# Patient Record
Sex: FEMALE | Race: WHITE | NOT HISPANIC OR LATINO | Employment: PART TIME | URBAN - METROPOLITAN AREA
[De-identification: names, ages, dates, MRNs, and addresses within clinical notes are randomized per-mention and may not be internally consistent; named-entity substitution may affect disease eponyms.]

---

## 2017-01-18 ENCOUNTER — CARE COORDINATION (OUTPATIENT)
Dept: PULMONOLOGY | Facility: CLINIC | Age: 57
End: 2017-01-18

## 2017-01-18 NOTE — PROGRESS NOTES
Telephone Encounter: Incoming    Reason for Call: Patient contacted regarding recent heart palpations, was seen by PCP at [facility], did Echo and EKG. Stated Echo showed enlarger Right artery related to ILD. PCP recommending she see a Cardiologist, they set one up at [facility] but wondering if Dr Ge would want her to see someone at the [facility].    Nursing Action/Patient Instruction: Informed her that we do have 2 cardiologist at the [facility] that see a lot of our patient with ILD that get Pulmonary Hypertension.     Patient Response/Questions/Concerns: She would like to see Cardiology here at [facility]    We will help arrange.

## 2017-02-05 NOTE — PROGRESS NOTES
"Kenny Lambert M.D.  Cardiovascular Medicine    I personally saw and examined this patient, discussed care with housestaff and other consultants, reviewed current laboratories and imaging studies, and conveyed impression and diagnostic/therapeutic plan to patient.    Problem List  1. Eosinophilic pneumonia   Steroid dependence  2. Breast cancer   Radiation   Chemotherapy  3. Atrial fibrillation (single episode while on steroids for the organizing pneumonia, 2014)  4. Hepatic steatosis       Referring:   Paradise Ge M.D.  Radha Suarez M.D.  Virgen William M.D.      Dear Providers,   We had the pleasure of meeting Ms. Meenakshi Serrano today in clinic. As you know, she is a 56 year old white female referred by Dr. William for palpitations (bounding sensation that lasted 2 hours about 1 month ago). Subsequent echocardiogram revealed mild right ventricular enlargement in the context of left sided breast cancer (IDCIS) treated with radiation and chemotherapy (no adriamycin; tamoxifen only).  She is a patient of Dr. Ge who sees her for chronic organizing eosinophilic pneumonia, possibly instigated by the prior radiation. She had a single episode of atrial fibrillation when her pulmonary disease was poorly controlled a couple years ago. She has been on tapering doses of prednisone (currently on 3 mg) and was started on montelukast and inhaled beclomethasone. She feels that her lungs are doing well.  She is exercising regularly with elliptical and yoga. 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  She did receive the flu vaccine.    With regard to the palpitations, she described these as a \"bounding\" sensation that occurred over a few hours then resolved. She had slight dizziness at that time. No presyncope. She palpated her pulse and it was regular and 80 bpm. This was nothing like the previous episode of atrial fibrillation. She had one other episode of palpitations in 2015. A one week Ziopatch in 2015 and a " "Holter monitor recently were done, but symptoms did not occur, and the tests were unremarkable.     PMH    History of breast cancer     IUD (intrauterine device) in place     Organizing pneumonia (HRC)     Paroxysmal atrial fibrillation (HRC)     Diverticulosis of colon     Myofascial pain on left side     History of dysplastic nevus     Insomnia     On prednisone therapy     History of basal cell cancer     History of actinic keratosis     Allergies: triamcinolone  Surgery: Knee and breast (left)   Family history: no sudden cardiac deaths    Objective    Vitals: /85 mmHg  Pulse 75  Ht 1.626 m (5' 4\")  Wt 73.891 kg (162 lb 14.4 oz)  BMI 27.95 kg/m2  SpO2 98%  Wt Readings from Last 2 Encounters:   02/09/17 73.891 kg (162 lb 14.4 oz)   12/09/16 70.308 kg (155 lb)     [unfilled]  Physical Exam:   General: Pleasant female, NAD  HEENT: anicteric. NCAT, MMM, normal palate, no xanthelasmas  JVP: no jugular venous distension  Cardiac: rrr. No m/r/g. Normal S1, S2. Radial and PT/DP are 2  Pulm: CTAB, no w/r/r  Abd: soft, nontener. +BS. No HSM.  Skin: no concerning lesions  Extremities: No LE edema, no cyanosis, w/w/p  Neuro: A&Ox3, no focal deficits, mentating well      Meds    Current Outpatient Prescriptions on File Prior to Visit:  predniSONE (DELTASONE) 1 MG tablet Take 1 tablet (1 mg) by mouth daily   montelukast (SINGULAIR) 10 MG tablet Take 1 tablet (10 mg) by mouth every evening   QVAR 80 MCG/ACT Inhaler INHALE 2 PUFFS BY MOUTH INTO THE LUNGS TWICE DAILY   sulfamethoxazole-trimethoprim (BACTRIM DS,SEPTRA DS) 800-160 MG per tablet Take 1 tab daily on Monday, Wednesday, and Friday.   Probiotic Product (PROBIOTIC PO)    omeprazole 20 MG tablet Take 1 tablet (20 mg) by mouth daily   VITAMIN D, CHOLECALCIFEROL, PO Take by mouth daily   Omega-3 Fatty Acids (OMEGA-3 FISH OIL PO) Take by mouth daily   Zolpidem Tartrate (AMBIEN PO) Take 10 mg by mouth nightly as needed for sleep     No current facility-administered " medications on file prior to visit.      Imaging     Chest CT 8/26/16  Impression:    1. Marked improvement in right lower lobe nodular opacities.  2. 7 mm right lower lobe pulmonary nodule. May be residual organizing  pneumonia versus separate nodule. Continued follow-up is recommended.  3. No new areas of consolidation.  4. Hepatic steatosis      ECHO 1/16/17  Global and regional left ventricular function is normal.  Left ventricular ejection fraction is visually estimated at 60%.  Mild right ventricular dilation with normal right ventricular function.  Agitated saline bubble study was performed to rule-out PFO/ASD.  Findings negative for interatrial shunting.  Pulmonary artery systolic pressure estimate is 24mmHg above RAP.    ECHO 10/17/2014  The rhythm was atrial fibrillation with controlled ventricular rate at rest.   The left ventricular size and ejection fraction are normal as the visual  ejection fraction is estimated at 55-60%.  The right ventricular systolic function is also normal. There is  mild tricuspid regurgitation.  The right   ventricular systolic pressure is normal approximated at 16mmHg plus the right atrial pressure. The aortic and mitral valves  are normal in structure and function. Normal left and right atrial size is noted by volume criteria.   EXCEPT FOR THE ATRIAL FIBRILLATION, THIS ECHO STUDY SHOWS ESSENTIALLY A NORMAL HEART with the observations as noted below.      Holter Monitor  - 1/16/17 - 1/17/17  Sinus rhythm.  Beats were in bradycardia (< 1%) and tachycardia (8%).      Heart rates range from  bpm.  Mean HR: 81 bpm.      Max R-R: 1.3 seconds.  Eight PACs and one PAC couplet.  Rare, mostly unifocal PVCs (20).    ZIO PATCH 7/3/15  Analysis Time: 7 days 3 hours (after artifact removed)  Patient had a min HR of 51 bpm, max HR of 173 bpm, and avg HR of 86 bpm.   Predominant underlying rhythm was Sinus Rhythm.   1 run of Supraventricular Tachycardia occurred lasting 4 beats with a  max rate of 113 bpm (avg 104 bpm). Supraventricular  Tachycardia was detected within +/- 45 seconds of the patient pressing the trigger button.  Isolated SVEs, SVE Couplets, and SVE Triplets were rare (0 to <1.0%).   Isolated VEs and VE Couplets were rare (0 to <1.0%), and no VE  Triplets were found.    EKG  Sinus bradycardia  Otherwise normal ECG  When compared with ECG of 26-JUN-2015 09:36,  Nonspecific T wave abnormality no longer evident in Anterior leads  Confirmed by JOSEPH MACK ([id number]),  RAVI HAGEN ([id number]) on 1/10/2017 1:08:36 PM    The patient did not report any symptoms.  CH2=V1; CH3=V3; CH1=V5/KB        Assessment/Plan       Orders:   Consider: CBC, CMP, BNP, TSH, iron, CRPinflam 14 hour fasting lipids  Consider Ziopath or Alive-Cor  event recorder to document nature of heart rhythm and rate during perceived episode and assess for atrial fibrillation  Cardiac JADON with gertrude enhancement suggested.      I do not think she has evidence of manifest pulmonary hypertension associated with PH, but rather probable RV changes secondary to radiation treatment for DCI.      CC: physicians above    I personally saw and examined this patient, discussed with fellow and patient and dictated note.

## 2017-02-07 ASSESSMENT — ENCOUNTER SYMPTOMS
HOT FLASHES: 1
NIGHT SWEATS: 1

## 2017-02-08 ENCOUNTER — PRE VISIT (OUTPATIENT)
Dept: CARDIOLOGY | Facility: CLINIC | Age: 57
End: 2017-02-08

## 2017-02-08 DIAGNOSIS — I51.7 RIGHT HEART ENLARGEMENT: ICD-10-CM

## 2017-02-08 DIAGNOSIS — J84.9 ILD (INTERSTITIAL LUNG DISEASE) (H): ICD-10-CM

## 2017-02-08 DIAGNOSIS — I48.91 ATRIAL FIBRILLATION, UNSPECIFIED TYPE (H): Primary | ICD-10-CM

## 2017-02-09 ENCOUNTER — OFFICE VISIT (OUTPATIENT)
Dept: CARDIOLOGY | Facility: CLINIC | Age: 57
End: 2017-02-09
Attending: INTERNAL MEDICINE
Payer: COMMERCIAL

## 2017-02-09 VITALS
SYSTOLIC BLOOD PRESSURE: 138 MMHG | HEART RATE: 75 BPM | WEIGHT: 162.9 LBS | HEIGHT: 64 IN | DIASTOLIC BLOOD PRESSURE: 85 MMHG | BODY MASS INDEX: 27.81 KG/M2 | OXYGEN SATURATION: 98 %

## 2017-02-09 DIAGNOSIS — I27.20 PULMONARY HYPERTENSION (H): Primary | ICD-10-CM

## 2017-02-09 PROCEDURE — 99204 OFFICE O/P NEW MOD 45 MIN: CPT | Mod: GC | Performed by: INTERNAL MEDICINE

## 2017-02-09 PROCEDURE — 99213 OFFICE O/P EST LOW 20 MIN: CPT | Mod: ZF

## 2017-02-09 ASSESSMENT — PAIN SCALES - GENERAL: PAINLEVEL: NO PAIN (0)

## 2017-02-09 NOTE — NURSING NOTE
Chief Complaint   Patient presents with     Eval/Assessment     Initial visit for enlarged right heart per pt noted on echo at [facility]; NEEDS EKG today

## 2017-02-09 NOTE — NURSING NOTE
Cardiac Testing: Patient given instructions regarding  Cardiac MRI. Discussed purpose, preparation, procedure and when to expect results reported back to the patient. Patient demonstrated understanding of this information and agreed to call with further questions or concerns.  Med Reconcile: Reviewed and verified all current medications with the patient. The updated medication list was printed and given to the patient.  Return Appointment: Patient given instructions regarding scheduling next clinic visit. Patient demonstrated understanding of this information and agreed to call with further questions or concerns.  Patient stated she understood all health information given and agreed to call with further questions or concerns.     Expand All Collapse All    Medication Changes:   No medication changes at this time. Please continue current medication regiment.   Patient Instructions:   Labs with your Primary Care MD (14 hour Fasting Lipid Profile)    Check-In   Time  Check-In Location  Estimated Length  Procedure   Name     [facility]   waiting room  60 minutes  Cardiac MRI**    Procedure Preparations & Instructions   This is a non-invasive procedure that DOES require preparation:   DO NOT use alcohol, tobacco or food/beverages containing caffeine for 12 hours prior to your procedure (ie. Coffee, tea, soda, chocolate, de-caffeinated beverages, certain medications including Excedrin, Anacin, NoDoz)    Follow up Appointment Information:   You will only need to follow up with us on an as needed basis. Please call us if your symptoms worsen or fail to improve for a follow up appointment. Thank you

## 2017-02-09 NOTE — PATIENT INSTRUCTIONS
Medication Changes:  No medication changes at this time. Please continue current medication regiment.     Patient Instructions:  Labs with your Primary Care MD (14 hour Fasting Lipid Profile)    Check-In  Time Check-In Location Estimated Length Procedure   Name        [facility]  waiting room 60 minutes Cardiac MRI**     Procedure Preparations & Instructions     This is a non-invasive procedure that DOES require preparation:    - DO NOT use alcohol, tobacco or food/beverages containing caffeine for 12 hours prior to your procedure (ie. Coffee, tea, soda, chocolate, de-caffeinated beverages, certain medications including Excedrin, Anacin, NoDoz)        Follow up Appointment Information:  You will only need to follow up with us on an as needed basis.  Please call us if your symptoms worsen or fail to improve for a follow up appointment.  Thank you       We are located on the third floor of the [facility] on the [facility].  Our address is     [address] [place] [address] [place] [address] [place] [address]      Thank you for allowing us to be a part of your care here at the [facility]    If you have questions or concerns please contact us at:    Lashawn Perez RN      Nurse Coordinator       Pulmonary Hypertension     [facility]   P)[phone]                ** Please note that you will NOT receive a reminder call regarding your scheduled testing, reminder calls are for provider appointments only.  If you are scheduled for testing within the [employer] system you may receive a call regarding pre-registration for billing purposes only.**     Remember to weigh yourself daily after voiding and before you consume any food or beverages and log the numbers.  If you have gained/lost 2 pounds overnight or 5 pounds in a week contact us immediately for medication adjustments or further instructions.

## 2017-02-09 NOTE — MR AVS SNAPSHOT
After Visit Summary   2/9/2017    Meenakshi Mckeon    MRN: [id number]           Patient Information     Date Of Birth          1960        Visit Information        Provider Department      2/9/2017 8:00 AM Kenny Lambert MD [facility]        Today's Diagnoses     Pulmonary hypertension (H)    -  1       Care Instructions    Medication Changes:  No medication changes at this time. Please continue current medication regiment.     Patient Instructions:  Labs with your Primary Care MD (14 hour Fasting Lipid Profile)    Check-In  Time Check-In Location Estimated Length Procedure   Name        [facility]  waiting room 60 minutes Cardiac MRI**     Procedure Preparations & Instructions     This is a non-invasive procedure that DOES require preparation:    - DO NOT use alcohol, tobacco or food/beverages containing caffeine for 12 hours prior to your procedure (ie. Coffee, tea, soda, chocolate, de-caffeinated beverages, certain medications including Excedrin, Anacin, NoDoz)        Follow up Appointment Information:  You will only need to follow up with us on an as needed basis.  Please call us if your symptoms worsen or fail to improve for a follow up appointment.  Thank you       We are located on the third floor of the [facility] ([facility] on the [facility].  Our address is     [address] [place] [address] on [address]rd [place] [address]      Thank you for allowing us to be a part of your care here at the [facility]    If you have questions or concerns please contact us at:    Lashawn Perez RN      Nurse Coordinator       Pulmonary Hypertension     [facility]   P)[phone]                ** Please note that you will NOT receive a reminder call regarding your scheduled testing, reminder calls are for provider appointments only.  If you are scheduled for testing within the [facility] system you may receive  a call regarding pre-registration for billing purposes only.**     Remember to weigh yourself daily after voiding and before you consume any food or beverages and log the numbers.  If you have gained/lost 2 pounds overnight or 5 pounds in a week contact us immediately for medication adjustments or further instructions.        Follow-ups after your visit        Follow-up notes from your care team     Return if symptoms worsen or fail to improve.      Your next 10 appointments already scheduled     Mar 31, 2017  8:40 AM   (Arrive by 8:25 AM)   CT CHEST W/O CONTRAST with UCCT2   [facility] [place] [facility] ([facility])    [address] [id number] [phone]           Please bring any scans or X-rays taken at other hospitals, if similar tests were done. Also bring a list of your medicines, including vitamins, minerals and over-the-counter drugs. It is safest to leave personal items at home.  Be sure to tell your doctor:   If you have any allergies.   If there s any chance you are pregnant.   If you are breastfeeding.   If you have any special needs.  You do not need to do anything special to prepare.  Please wear loose clothing, such as a sweat suit or jogging clothes. Avoid snaps, zippers and other metal. We may ask you to undress and put on a hospital gown.            Mar 31, 2017  9:00 AM   FULL PULMONARY FUNCTION with  PFL D   [facility] ([facility])    [address] [id number] [phone]            Mar 31, 2017 10:00 AM   (Arrive by 9:45 AM)   Return Interstitial Lung with Paradise Ge MD   [facility] ([facility])    [address] [id number] [phone]              Future tests that were ordered for you today     Open Future Orders        Priority Expected Expires  "Ordered    MRI Cardiac w/contrast and flow Routine 2/10/2017 2/9/2018 2/9/2017    EKG 12-lead, tracing only (Future) Routine 2/9/2017 3/8/2017 2/8/2017    Comprehensive metabolic panel Routine 2/9/2017 3/8/2017 2/8/2017    INR Routine 2/9/2017 3/8/2017 2/8/2017    CBC with platelets Routine 2/9/2017 3/8/2017 2/8/2017            Who to contact     If you have questions or need follow up information about today's clinic visit or your schedule please contact [facility] directly at [phone].  Normal or non-critical lab and imaging results will be communicated to you by [employer], letter or phone within 4 business days after the clinic has received the results. If you do not hear from us within 7 days, please contact the clinic through [employer] or phone. If you have a critical or abnormal lab result, we will notify you by phone as soon as possible.  Submit refill requests through [employer] or call your pharmacy and they will forward the refill request to us. Please allow 3 business days for your refill to be completed.          Additional Information About Your Visit        [employer] Information     [employer] gives you secure access to your electronic health record. If you see a primary care provider, you can also send messages to your care team and make appointments. If you have questions, please call your primary care clinic.  If you do not have a primary care provider, please call [phone] and they will assist you.        Care EveryWhere ID     This is your Care EveryWhere ID. This could be used by other organizations to access your [facility] medical records  [id number]        Your Vitals Were     Pulse Height BMI (Body Mass Index) Pulse Oximetry          75 1.626 m (5' 4\") 27.95 kg/m2 98%         Blood Pressure from Last 3 Encounters:   02/09/17 138/85   12/09/16 137/82   08/26/16 123/80    Weight from Last 3 Encounters:   02/09/17 73.891 kg (162 lb 14.4 oz)   12/09/16 70.308 kg (155 lb)   08/26/16 71.668 kg " (158 lb)                 Today's Medication Changes          These changes are accurate as of: 2/9/17  9:12 AM.  If you have any questions, ask your nurse or doctor.               These medicines have changed or have updated prescriptions.        Dose/Directions    predniSONE 1 MG tablet   Commonly known as:  DELTASONE   This may have changed:  additional instructions   Used for:  ILD (interstitial lung disease) (H), Pulmonary nodules        Dose:  1 mg   Take 1 tablet (1 mg) by mouth daily   Quantity:  360 tablet   Refills:  3                Primary Care Provider Office Phone # Fax #    Virgen Graham [phone]       [facility] [address] [place] [address]        Thank you!     Thank you for choosing [facility]  for your care. Our goal is always to provide you with excellent care. Hearing back from our patients is one way we can continue to improve our services. Please take a few minutes to complete the written survey that you may receive in the mail after your visit with us. Thank you!             Your Updated Medication List - Protect others around you: Learn how to safely use, store and throw away your medicines at www.disposemymeds.org.          This list is accurate as of: 2/9/17  9:12 AM.  Always use your most recent med list.                   Brand Name Dispense Instructions for use    AMBIEN PO      Take 10 mg by mouth nightly as needed for sleep       montelukast 10 MG tablet    SINGULAIR    90 tablet    Take 1 tablet (10 mg) by mouth every evening       OMEGA-3 FISH OIL PO      Take by mouth daily       omeprazole 20 MG tablet     30 tablet    Take 1 tablet (20 mg) by mouth daily       predniSONE 1 MG tablet    DELTASONE    360 tablet    Take 1 tablet (1 mg) by mouth daily       PROBIOTIC PO          QVAR 80 MCG/ACT Inhaler   Generic drug:  beclomethasone     8.7 g    INHALE 2 PUFFS BY MOUTH INTO THE LUNGS TWICE DAILY        sulfamethoxazole-trimethoprim 800-160 MG per tablet    BACTRIM DS/SEPTRA DS    36 tablet    Take 1 tab daily on Monday, Wednesday, and Friday.       VITAMIN D (CHOLECALCIFEROL) PO      Take by mouth daily

## 2017-02-09 NOTE — LETTER
"2/9/2017      RE: Meenakshi Mckeon  [address] [id number]       Dear Colleague,    Thank you for the opportunity to participate in the care of your patient, Meenakshi Mckeon, at the [facility]. Please see a copy of my visit note below.    Kenny Lambert M.D.  Cardiovascular Medicine    I personally saw and examined this patient, discussed care with housestaff and other consultants, reviewed current laboratories and imaging studies, and conveyed impression and diagnostic/therapeutic plan to patient.    Problem List  1. Eosinophilic pneumonia   Steroid dependence  2. Breast cancer   Radiation   Chemotherapy  3. Atrial fibrillation (single episode while on steroids for the organizing pneumonia, 2014)  4. Hepatic steatosis       Referring:   Paradise Ge M.D.  Radha Suarez M.D.  Virgen William M.D.      Dear Providers,   We had the pleasure of meeting Ms. Meenakshi Serrano today in clinic. As you know, she is a 56 year old white female referred by Dr. William for palpitations (bounding sensation that lasted 2 hours about 1 month ago). Subsequent echocardiogram revealed mild right ventricular enlargement in the context of left sided breast cancer (IDCIS) treated with radiation and chemotherapy (no adriamycin; tamoxifen only).  She is a patient of Dr. Ge who sees her for chronic organizing eosinophilic pneumonia, possibly instigated by the prior radiation. She had a single episode of atrial fibrillation when her pulmonary disease was poorly controlled a couple years ago. She has been on tapering doses of prednisone (currently on 3 mg) and was started on montelukast and inhaled beclomethasone. She feels that her lungs are doing well.  She is exercising regularly with elliptical and yoga. 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  She did receive the flu vaccine.    With regard to the palpitations, she described these as a \"bounding\" sensation " "that occurred over a few hours then resolved. She had slight dizziness at that time. No presyncope. She palpated her pulse and it was regular and 80 bpm. This was nothing like the previous episode of atrial fibrillation. She had one other episode of palpitations in 2015. A one week Ziopatch in 2015 and a Holter monitor recently were done, but symptoms did not occur, and the tests were unremarkable.     PMH    History of breast cancer     IUD (intrauterine device) in place     Organizing pneumonia (HRC)     Paroxysmal atrial fibrillation (HRC)     Diverticulosis of colon     Myofascial pain on left side     History of dysplastic nevus     Insomnia     On prednisone therapy     History of basal cell cancer     History of actinic keratosis     Allergies: triamcinolone  Surgery: Knee and breast (left)   Family history: no sudden cardiac deaths    Objective    Vitals: /85 mmHg  Pulse 75  Ht 1.626 m (5' 4\")  Wt 73.891 kg (162 lb 14.4 oz)  BMI 27.95 kg/m2  SpO2 98%  Wt Readings from Last 2 Encounters:   02/09/17 73.891 kg (162 lb 14.4 oz)   12/09/16 70.308 kg (155 lb)     [unfilled]  Physical Exam:   General: Pleasant female, NAD  HEENT: anicteric. NCAT, MMM, normal palate, no xanthelasmas  JVP: no jugular venous distension  Cardiac: rrr. No m/r/g. Normal S1, S2. Radial and PT/DP are 2  Pulm: CTAB, no w/r/r  Abd: soft, nontener. +BS. No HSM.  Skin: no concerning lesions  Extremities: No LE edema, no cyanosis, w/w/p  Neuro: A&Ox3, no focal deficits, mentating well      Meds    Current Outpatient Prescriptions on File Prior to Visit:  predniSONE (DELTASONE) 1 MG tablet Take 1 tablet (1 mg) by mouth daily   montelukast (SINGULAIR) 10 MG tablet Take 1 tablet (10 mg) by mouth every evening   QVAR 80 MCG/ACT Inhaler INHALE 2 PUFFS BY MOUTH INTO THE LUNGS TWICE DAILY   sulfamethoxazole-trimethoprim (BACTRIM DS,SEPTRA DS) 800-160 MG per tablet Take 1 tab daily on Monday, Wednesday, and Friday.   Probiotic Product " (PROBIOTIC PO)    omeprazole 20 MG tablet Take 1 tablet (20 mg) by mouth daily   VITAMIN D, CHOLECALCIFEROL, PO Take by mouth daily   Omega-3 Fatty Acids (OMEGA-3 FISH OIL PO) Take by mouth daily   Zolpidem Tartrate (AMBIEN PO) Take 10 mg by mouth nightly as needed for sleep     No current facility-administered medications on file prior to visit.      Imaging     Chest CT 8/26/16  Impression:    1. Marked improvement in right lower lobe nodular opacities.  2. 7 mm right lower lobe pulmonary nodule. May be residual organizing  pneumonia versus separate nodule. Continued follow-up is recommended.  3. No new areas of consolidation.  4. Hepatic steatosis      ECHO 1/16/17  Global and regional left ventricular function is normal.  Left ventricular ejection fraction is visually estimated at 60%.  Mild right ventricular dilation with normal right ventricular function.  Agitated saline bubble study was performed to rule-out PFO/ASD.  Findings negative for interatrial shunting.  Pulmonary artery systolic pressure estimate is 24mmHg above RAP.    ECHO 10/17/2014  The rhythm was atrial fibrillation with controlled ventricular rate at rest.   The left ventricular size and ejection fraction are normal as the visual  ejection fraction is estimated at 55-60%.  The right ventricular systolic function is also normal. There is  mild tricuspid regurgitation.  The right   ventricular systolic pressure is normal approximated at 16mmHg plus the right atrial pressure. The aortic and mitral valves  are normal in structure and function. Normal left and right atrial size is noted by volume criteria.   EXCEPT FOR THE ATRIAL FIBRILLATION, THIS ECHO STUDY SHOWS ESSENTIALLY A NORMAL HEART with the observations as noted below.      Holter Monitor  - 1/16/17 - 1/17/17  Sinus rhythm.  Beats were in bradycardia (< 1%) and tachycardia (8%).      Heart rates range from  bpm.  Mean HR: 81 bpm.      Max R-R: 1.3 seconds.  Eight PACs and one PAC  couplet.  Rare, mostly unifocal PVCs (20).    ZIO PATCH 7/3/15  Analysis Time: 7 days 3 hours (after artifact removed)  Patient had a min HR of 51 bpm, max HR of 173 bpm, and avg HR of 86 bpm.   Predominant underlying rhythm was Sinus Rhythm.   1 run of Supraventricular Tachycardia occurred lasting 4 beats with a max rate of 113 bpm (avg 104 bpm). Supraventricular  Tachycardia was detected within +/- 45 seconds of the patient pressing the trigger button.  Isolated SVEs, SVE Couplets, and SVE Triplets were rare (0 to <1.0%).   Isolated VEs and VE Couplets were rare (0 to <1.0%), and no VE  Triplets were found.    EKG  Sinus bradycardia  Otherwise normal ECG  When compared with ECG of 2015 09:36,  Nonspecific T wave abnormality no longer evident in Anterior leads  Confirmed by JOSEPH MACK ([id number]),  RAVI HAGEN ([id number]) on 1/10/2017 1:08:36 PM    The patient did not report any symptoms.  CH2=V1; CH3=V3; CH1=V5/KB        Assessment/Plan       Orders:   Consider: CBC, CMP, BNP, TSH, iron, CRPinflam 14 hour fasting lipids  Consider Ziopath or Alive-Cor  event recorder to document nature of heart rhythm and rate during perceived episode and assess for atrial fibrillation  Cardiac JADON with gertrude enhancement suggested.      I do not think she has evidence of manifest pulmonary hypertension associated with PH, but rather probable RV changes secondary to radiation treatment for DCI.      CC: physicians above    I personally saw and examined this patient, discussed with fellow and patient and dictated note.    2017            Paradise eG MD   [facility]   [address]   [place] [facility] [address]      Virgen Graham MD   [facility]   [address] [place] [address]      RE: Meenakshi Mckeon   MRN: [id number]   : 1960      Dear Sonu:       I had the opportunity to see your patient, Meenakshi Mckeon.  A full note will follow from our fellow with  whom I saw the patient.  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  There is no family history of sudden cardiac death nor cardiomyopathy.  She was treated with tamoxifen and localized radiotherapy to the left mammary region as treatment for ductal carcinoma in situ.  She has no history of stimulant usage, alcohol abuse, pulmonary emboli, deep venous thrombosis, connective tissue disease, congenital heart disease, liver disease or other obvious etiologies for right circulatory dysfunction.      Review of her chart shows a slightly enlarged right ventricle with normal ventricular function and normal estimated pulmonary artery pressure.  Incidentally noted was hepatic steatosis.  She has no history of hyperlipidemia.  She is not diabetic.  Her lipids do not appear to have been measured recently.      ALLERGIES:  None.      PHYSICAL EXAMINATION:  Alert, oriented, well-developed, well-nourished white female.  The neck is supple.  Jugular venous pressure is within normal limits.  The chest is clear to auscultation and percussion.  The rhythm is regular.  There is no evidence of left-to-right ventricular overactivity.  S1, S2 without heave, thrill, rub, murmur, gallop.  There is no peripheral edema.  The liver span is 8 cm and it is not enlarged.  There is no evidence of portal hypertension.      In summary, a 56-year-old white female with a history of palpitations, 1 documented episode of atrial fibrillation with a serious respiratory illness.  She has undergone previous monitoring which was unremarkable.      We discussed:   1.  She may wish to obtain the [employer] application [employer] in order to use it as an event recorder.  This has been very successful in patients who have very infrequent episodes to document the  nature of the rhythm during their periods of symptoms.      2.  We suggested she talk with Virgen regarding a 14-hour fasting lipid profile to look at triglycerides.  We discussed with her the various factors that contribute to steatosis including steroids, diet, alcohol, intrinsic errors of triglyceride metabolism and the like.      3.  One could consider cardiac MRI with gadolinium enhancement.  It is most likely that the right ventricular changes are a result of the radiation therapy that she received for ductal carcinoma in situ.  This would at least provide a very quantitative baseline determination.  As you know, the right ventricle is extremely difficult to image accurately in view of its banana-shaped configuration which goes in and out of 2-point imaging.      We did not give her a return appointment but would be happy to see her at any time you think desirable.      Sincerely yours,      Kenny Lambert MD      D: 2017 09:19   T: 2017 10:21   MT: ZAK      Name:     DAISY TORO   MRN:      [id number]        Account:      [id number]   :      1960           Service Date: 2017      Document: [id number]

## 2017-02-10 NOTE — PROGRESS NOTES
2017            Paradise Ge MD   [facility]   [address]   [facility] [address]      Virgen Graham MD   [facility]   [address] [place] [address]      RE: Meenakshi Mckeon   MRN: [id number]   : 1960      Dear Sonu:       I had the opportunity to see your patient, Meenakshi Mckeon.  A full note will follow from our fellow with whom I saw the patient.  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  There is no family history of sudden cardiac death nor cardiomyopathy.  She was treated with tamoxifen and localized radiotherapy to the left mammary region as treatment for ductal carcinoma in situ.  She has no history of stimulant usage, alcohol abuse, pulmonary emboli, deep venous thrombosis, connective tissue disease, congenital heart disease, liver disease or other obvious etiologies for right circulatory dysfunction.      Review of her chart shows a slightly enlarged right ventricle with normal ventricular function and normal estimated pulmonary artery pressure.  Incidentally noted was hepatic steatosis.  She has no history of hyperlipidemia.  She is not diabetic.  Her lipids do not appear to have been measured recently.      ALLERGIES:  None.      PHYSICAL EXAMINATION:  Alert, oriented, well-developed, well-nourished white female.  The neck is supple.  Jugular venous pressure is within normal limits.  The chest is clear to auscultation and percussion.  The rhythm is regular.  There is no evidence of left-to-right ventricular overactivity.  S1, S2 without heave, thrill, rub, murmur, gallop.  There is no peripheral edema.  The liver span is 8 cm and it is not enlarged.  There is no evidence of portal hypertension.      In summary, a 56-year-old  white female with a history of palpitations, 1 documented episode of atrial fibrillation with a serious respiratory illness.  She has undergone previous monitoring which was unremarkable.      We discussed:   1.  She may wish to obtain the iPhone application [employer] in order to use it as an event recorder.  This has been very successful in patients who have very infrequent episodes to document the nature of the rhythm during their periods of symptoms.      2.  We suggested she talk with Virgen regarding a 14-hour fasting lipid profile to look at triglycerides.  We discussed with her the various factors that contribute to steatosis including steroids, diet, alcohol, intrinsic errors of triglyceride metabolism and the like.      3.  One could consider cardiac MRI with gadolinium enhancement.  It is most likely that the right ventricular changes are a result of the radiation therapy that she received for ductal carcinoma in situ.  This would at least provide a very quantitative baseline determination.  As you know, the right ventricle is extremely difficult to image accurately in view of its banana-shaped configuration which goes in and out of 2-point imaging.      We did not give her a return appointment but would be happy to see her at any time you think desirable.      Sincerely yours,      MD JARET Perez MD             D: 2017 09:19   T: 2017 10:21   MT: ZAK      Name:     DAISY TORO   MRN:      [id number]        Account:      [id number]   :      1960           Service Date: 2017      Document: [id number]

## 2017-03-26 ASSESSMENT — ENCOUNTER SYMPTOMS
CHILLS: 0
JOINT SWELLING: 0
INCREASED ENERGY: 0
HYPOTENSION: 0
POLYPHAGIA: 0
EXERCISE INTOLERANCE: 0
DECREASED APPETITE: 0
LIGHT-HEADEDNESS: 0
SLEEP DISTURBANCES DUE TO BREATHING: 0
WEIGHT GAIN: 0
MYALGIAS: 0
ALTERED TEMPERATURE REGULATION: 0
MUSCLE WEAKNESS: 0
ARTHRALGIAS: 1
SYNCOPE: 0
BACK PAIN: 0
ORTHOPNEA: 0
WEIGHT LOSS: 0
LEG SWELLING: 0
FATIGUE: 0
HALLUCINATIONS: 0
TACHYCARDIA: 0
LEG PAIN: 0
FEVER: 0
NECK PAIN: 0
MUSCLE CRAMPS: 1
HYPERTENSION: 0
POLYDIPSIA: 0
CLAUDICATION: 0
PALPITATIONS: 0
NIGHT SWEATS: 1
STIFFNESS: 1

## 2017-03-31 ENCOUNTER — OFFICE VISIT (OUTPATIENT)
Dept: PULMONOLOGY | Facility: CLINIC | Age: 57
End: 2017-03-31
Attending: INTERNAL MEDICINE
Payer: COMMERCIAL

## 2017-03-31 VITALS
RESPIRATION RATE: 16 BRPM | DIASTOLIC BLOOD PRESSURE: 85 MMHG | SYSTOLIC BLOOD PRESSURE: 148 MMHG | BODY MASS INDEX: 27.18 KG/M2 | OXYGEN SATURATION: 92 % | WEIGHT: 159.2 LBS | HEIGHT: 64 IN | HEART RATE: 84 BPM

## 2017-03-31 DIAGNOSIS — J84.9 ILD (INTERSTITIAL LUNG DISEASE) (H): ICD-10-CM

## 2017-03-31 DIAGNOSIS — J82.89 PULMONARY EOSINOPHILIA (H): ICD-10-CM

## 2017-03-31 DIAGNOSIS — J84.9 ILD (INTERSTITIAL LUNG DISEASE) (H): Primary | ICD-10-CM

## 2017-03-31 DIAGNOSIS — R91.8 PULMONARY NODULES: ICD-10-CM

## 2017-03-31 PROBLEM — J82.81 EOSINOPHILIC PNEUMONIA (H): Status: ACTIVE | Noted: 2017-03-31

## 2017-03-31 PROCEDURE — 99212 OFFICE O/P EST SF 10 MIN: CPT | Mod: ZF

## 2017-03-31 RX ORDER — PREDNISONE 5 MG/1
TABLET ORAL
Qty: 120 TABLET | Refills: 11 | Status: SHIPPED | OUTPATIENT
Start: 2017-03-31 | End: 2018-05-04

## 2017-03-31 RX ORDER — MONTELUKAST SODIUM 10 MG/1
10 TABLET ORAL EVERY EVENING
Qty: 90 TABLET | Refills: 3 | Status: SHIPPED | OUTPATIENT
Start: 2017-03-31 | End: 2018-05-04

## 2017-03-31 ASSESSMENT — PAIN SCALES - GENERAL: PAINLEVEL: NO PAIN (0)
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
## 2017-03-31 NOTE — MR AVS SNAPSHOT
After Visit Summary   3/31/2017    Meenakshi Mckeon    MRN: [id number]           Patient Information     Date Of Birth          1960        Visit Information        Provider Department      3/31/2017 10:00 AM Paradise Ge MD [facility]        Today's Diagnoses     ILD (interstitial lung disease) (H)    -  1       Follow-ups after your visit        Follow-up notes from your care team     Return in about 3 months (around 6/30/2017).      Your next 10 appointments already scheduled     Jun 30, 2017  6:40 AM CDT   (Arrive by 6:25 AM)   CT CHEST W/O CONTRAST with UCCT2   [facility] [place] [facility] ([facility])    [address] [address] [phone]           Please bring any scans or X-rays taken at other hospitals, if similar tests were done. Also bring a list of your medicines, including vitamins, minerals and over-the-counter drugs. It is safest to leave personal items at home.  Be sure to tell your doctor:   If you have any allergies.   If there s any chance you are pregnant.   If you are breastfeeding.   If you have any special needs.  You do not need to do anything special to prepare.  Please wear loose clothing, such as a sweat suit or jogging clothes. Avoid snaps, zippers and other metal. We may ask you to undress and put on a hospital gown.            Jun 30, 2017  7:00 AM CDT   FULL PULMONARY FUNCTION with  PFL A   [facility] Pulmonary Function Testing ([facility])    [address] [id number] [phone]            Jun 30, 2017  8:00 AM CDT   (Arrive by 7:45 AM)   Return Interstitial Lung with Paradise Ge MD   [facility] ([facility])    [address] [id number] [phone]              Future tests that were ordered for you today     Open  "Future Orders        Priority Expected Expires Ordered    CT Chest w/o Contrast Routine 6/30/2017 3/31/2018 3/31/2017    Pulmonary Function Test Routine 6/30/2017 3/31/2018 3/31/2017    General PFT Lab (Please always keep checked) Routine  3/31/2018 3/31/2017            Who to contact     If you have questions or need follow up information about today's clinic visit or your schedule please contact [facility] directly at [phone].  Normal or non-critical lab and imaging results will be communicated to you by mobintenthart, letter or phone within 4 business days after the clinic has received the results. If you do not hear from us within 7 days, please contact the clinic through Vibrynt or phone. If you have a critical or abnormal lab result, we will notify you by phone as soon as possible.  Submit refill requests through Vibrynt or call your pharmacy and they will forward the refill request to us. Please allow 3 business days for your refill to be completed.          Additional Information About Your Visit        Vibrynt Information     Vibrynt gives you secure access to your electronic health record. If you see a primary care provider, you can also send messages to your care team and make appointments. If you have questions, please call your primary care clinic.  If you do not have a primary care provider, please call [phone] and they will assist you.        Care EveryWhere ID     This is your Care EveryWhere ID. This could be used by other organizations to access your [facility] medical records  [id number]        Your Vitals Were     Pulse Respirations Height Pulse Oximetry BMI (Body Mass Index)       84 16 1.626 m (5' 4\") 92% 27.33 kg/m2        Blood Pressure from Last 3 Encounters:   03/31/17 148/85   02/09/17 138/85   12/09/16 137/82    Weight from Last 3 Encounters:   03/31/17 72.2 kg (159 lb 3.2 oz)   02/09/17 73.9 kg (162 lb 14.4 oz)   12/09/16 70.3 kg (155 lb)            "      Today's Medication Changes          These changes are accurate as of: 3/31/17 10:47 AM.  If you have any questions, ask your nurse or doctor.               Start taking these medicines.        Dose/Directions    zoster vaccine live (PF) injection   Commonly known as:  ZOSTAVAX   Used for:  ILD (interstitial lung disease) (H)   Started by:  Paradise Ge MD        Dose:  1 each   Inject 0.65 mLs Subcutaneous once for 1 dose   Quantity:  0.65 mL   Refills:  0         These medicines have changed or have updated prescriptions.        Dose/Directions    beclomethasone 80 MCG/ACT Inhaler   Commonly known as:  QVAR   This may have changed:  See the new instructions.   Used for:  ILD (interstitial lung disease) (H)   Changed by:  Paradise Ge MD        INHALE 2 PUFFS BY MOUTH INTO THE LUNGS TWICE DAILY   Quantity:  8.7 g   Refills:  11       * predniSONE 1 MG tablet   Commonly known as:  DELTASONE   This may have changed:  additional instructions   Used for:  ILD (interstitial lung disease) (H), Pulmonary nodules   Changed by:  Paradise Ge MD        Dose:  1 mg   Take 1 tablet (1 mg) by mouth daily   Quantity:  360 tablet   Refills:  3       * predniSONE 5 MG tablet   Commonly known as:  DELTASONE   This may have changed:  You were already taking a medication with the same name, and this prescription was added. Make sure you understand how and when to take each.   Used for:  ILD (interstitial lung disease) (H)   Changed by:  Paradise Ge MD        20 mg daily for 2 weeks, then 15 mg daily for 2 weeks, then 10 mg daily.   Quantity:  120 tablet   Refills:  11       * Notice:  This list has 2 medication(s) that are the same as other medications prescribed for you. Read the directions carefully, and ask your doctor or other care provider to review them with you.         Where to get your medicines      These medications were sent to [facility] [address] [facility] [place] - [address] [facility]  [address]  [address] [address]     Phone:  [phone]     predniSONE 5 MG tablet         Some of these will need a paper prescription and others can be bought over the counter.  Ask your nurse if you have questions.     Bring a paper prescription for each of these medications     beclomethasone 80 MCG/ACT Inhaler    montelukast 10 MG tablet    zoster vaccine live (PF) injection                Primary Care Provider Office Phone # Fax #    Virgen Graham [phone]       [facility] [address] [place] [address]        Thank you!     Thank you for choosing [facility]  for your care. Our goal is always to provide you with excellent care. Hearing back from our patients is one way we can continue to improve our services. Please take a few minutes to complete the written survey that you may receive in the mail after your visit with us. Thank you!             Your Updated Medication List - Protect others around you: Learn how to safely use, store and throw away your medicines at www.disposemymeds.org.          This list is accurate as of: 3/31/17 10:47 AM.  Always use your most recent med list.                   Brand Name Dispense Instructions for use    AMBIEN PO      Take 10 mg by mouth nightly as needed for sleep       beclomethasone 80 MCG/ACT Inhaler    QVAR    8.7 g    INHALE 2 PUFFS BY MOUTH INTO THE LUNGS TWICE DAILY       montelukast 10 MG tablet    SINGULAIR    90 tablet    Take 1 tablet (10 mg) by mouth every evening       OMEGA-3 FISH OIL PO      Take by mouth daily       omeprazole 20 MG tablet     30 tablet    Take 1 tablet (20 mg) by mouth daily       * predniSONE 1 MG tablet    DELTASONE    360 tablet    Take 1 tablet (1 mg) by mouth daily       * predniSONE 5 MG tablet    DELTASONE    120 tablet    20 mg daily for 2 weeks, then 15 mg daily for 2 weeks, then 10 mg daily.       PROBIOTIC PO           sulfamethoxazole-trimethoprim 800-160 MG per tablet    BACTRIM DS/SEPTRA DS    36 tablet    Take 1 tab daily on Monday, Wednesday, and Friday.       VITAMIN D (CHOLECALCIFEROL) PO      Take by mouth daily       zoster vaccine live (PF) injection    ZOSTAVAX    0.65 mL    Inject 0.65 mLs Subcutaneous once for 1 dose       * Notice:  This list has 2 medication(s) that are the same as other medications prescribed for you. Read the directions carefully, and ask your doctor or other care provider to review them with you.
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
## 2017-03-31 NOTE — NURSING NOTE
Chief Complaint   Patient presents with     Interstitial Lung Disease (ILD)     Patient is being seen for ILD follow up      Zenaida Carrasco CMA at 9:53 AM on 3/31/2017

## 2017-04-14 ENCOUNTER — CARE COORDINATION (OUTPATIENT)
Dept: PULMONOLOGY | Facility: CLINIC | Age: 57
End: 2017-04-14

## 2017-04-14 DIAGNOSIS — J22 LOWER RESPIRATORY INFECTION: Primary | ICD-10-CM

## 2017-04-14 RX ORDER — DOXYCYCLINE HYCLATE 100 MG
100 TABLET ORAL 2 TIMES DAILY
Qty: 28 TABLET | Refills: 0 | Status: SHIPPED | OUTPATIENT
Start: 2017-04-14 | End: 2017-04-28

## 2017-04-14 NOTE — PROGRESS NOTES
Telephone Encounter: Incoming    Reason for Call: Coughing for the past few days, green production, mild chills, low grade fever 99, nasal drainage. Wondering about getting treated or if she needs to be seen?    Nursing Action/Patient Instruction: Discussed with Dr Ge, she would like to treat her with Doxycycline BID for 14 days. Informed patient.    Patient Response/Questions/Concerns: Agreed with plan.

## 2017-04-24 LAB
DLCOUNC-%PRED-PRE: 101 %
DLCOUNC-PRE: 22.27 ML/MIN/MMHG
DLCOUNC-PRED: 21.96 ML/MIN/MMHG
ERV-%PRED-PRE: 30 %
ERV-PRE: 0.23 L
ERV-PRED: 0.76 L
EXPTIME-PRE: 8.03 SEC
FEF2575-%PRED-PRE: 111 %
FEF2575-PRE: 2.68 L/SEC
FEF2575-PRED: 2.41 L/SEC
FEFMAX-%PRED-PRE: 118 %
FEFMAX-PRE: 7.61 L/SEC
FEFMAX-PRED: 6.45 L/SEC
FEV1-%PRED-PRE: 97 %
FEV1-PRE: 2.52 L
FEV1FEV6-PRE: 83 %
FEV1FEV6-PRED: 81 %
FEV1FVC-PRE: 83 %
FEV1FVC-PRED: 80 %
FEV1SVC-PRE: 82 %
FEV1SVC-PRED: 77 %
FIFMAX-PRE: 4 L/SEC
FRCPLETH-%PRED-PRE: 84 %
FRCPLETH-PRE: 2.29 L
FRCPLETH-PRED: 2.7 L
FVC-%PRED-PRE: 93 %
FVC-PRE: 3.05 L
FVC-PRED: 3.26 L
IC-%PRED-PRE: 110 %
IC-PRE: 2.84 L
IC-PRED: 2.57 L
RVPLETH-%PRED-PRE: 111 %
RVPLETH-PRE: 2.06 L
RVPLETH-PRED: 1.85 L
TLCPLETH-%PRED-PRE: 103 %
TLCPLETH-PRE: 5.14 L
TLCPLETH-PRED: 4.94 L
VA-%PRED-PRE: 88 %
VA-PRE: 4.49 L
VC-%PRED-PRE: 92 %
VC-PRE: 3.08 L
VC-PRED: 3.33 L

## 2017-06-17 ENCOUNTER — HEALTH MAINTENANCE LETTER (OUTPATIENT)
Age: 57
End: 2017-06-17

## 2017-06-19 ENCOUNTER — CARE COORDINATION (OUTPATIENT)
Dept: PULMONOLOGY | Facility: CLINIC | Age: 57
End: 2017-06-19

## 2017-06-19 NOTE — PROGRESS NOTES
Patient called stating that she is having symptoms of a flare up of chronic eosinophilic pneumonia.  She is scheduled to see Dr. Ge with a chest CT scan on June 30 and would like to know if she can come in this week for a chest CT instead of waiting until June 30.  Discussed with Dr. Ge who okayed patient to get chest CT scan which we will review this week. Patient is in agreement with this plan and will get chest CT done this afternoon.

## 2017-06-21 ENCOUNTER — CARE COORDINATION (OUTPATIENT)
Dept: PULMONOLOGY | Facility: CLINIC | Age: 57
End: 2017-06-21

## 2017-06-21 NOTE — PROGRESS NOTES
Patient sent the following email:      Just checking in to see if you have the results of the CT I had done the other day.  I am off work today and doing various errands.    My cell phone is [phone]  Home phone is [phone]    I should be able to be reached at either of those numbers, otherwise email is fine too.    Also, FYI... I am going to go see my primary MD, Virgen Graham tomorrow morning.  I have now a second vaginal yeast infection.  The first I called her and she gave me a course of diflucan , which cleared it up, but now I have another.  Also noticed I might have some thrush.  Should I continue on the QVAR?  I also wanted her to check some blood work, specifically a glucose in light of the yeast infections.  Please either communicate with her or myself if you and Dr. Ge would recommend other blood work or tests.  my appointment is at 7 a.m. tomorrow.  She is with [facility].    My current oral prednisone dose is 10 mg.      Thank you,    Discussed with Dr. Ge.  Patient's chest CT scan looks improved over previous CT scan.  Patient should hold QVAR until she sees Dr. Ge next week.  If patient gets a second course of diflucan from Dr. Martinez she does not need further treatment for thrush.  If not given diflucan, Dr. Ge will prescribe Mycelex Troches.  Patient is to stay on prednisone 10 mg.  Patient agreed with plan.

## 2017-06-25 ASSESSMENT — ENCOUNTER SYMPTOMS
SINUS PAIN: 0
TROUBLE SWALLOWING: 0
DECREASED LIBIDO: 1
NAIL CHANGES: 0
NECK MASS: 0
TASTE DISTURBANCE: 0
SKIN CHANGES: 0
SORE THROAT: 1
HOARSE VOICE: 1
SINUS CONGESTION: 0
HOT FLASHES: 1
POOR WOUND HEALING: 0
SMELL DISTURBANCE: 0

## 2017-06-30 ENCOUNTER — OFFICE VISIT (OUTPATIENT)
Dept: PULMONOLOGY | Facility: CLINIC | Age: 57
End: 2017-06-30
Attending: INTERNAL MEDICINE
Payer: COMMERCIAL

## 2017-06-30 VITALS
DIASTOLIC BLOOD PRESSURE: 88 MMHG | BODY MASS INDEX: 27.59 KG/M2 | SYSTOLIC BLOOD PRESSURE: 145 MMHG | RESPIRATION RATE: 16 BRPM | HEIGHT: 64 IN | OXYGEN SATURATION: 97 % | WEIGHT: 161.6 LBS | HEART RATE: 68 BPM

## 2017-06-30 DIAGNOSIS — J84.9 ILD (INTERSTITIAL LUNG DISEASE) (H): Primary | ICD-10-CM

## 2017-06-30 DIAGNOSIS — R91.8 PULMONARY NODULES: ICD-10-CM

## 2017-06-30 DIAGNOSIS — J84.9 ILD (INTERSTITIAL LUNG DISEASE) (H): ICD-10-CM

## 2017-06-30 PROCEDURE — 99212 OFFICE O/P EST SF 10 MIN: CPT | Mod: ZF

## 2017-06-30 ASSESSMENT — PAIN SCALES - GENERAL: PAINLEVEL: NO PAIN (0)

## 2017-06-30 NOTE — LETTER
6/30/2017      RE: Meenakshi Mckeon  [address]  [facility] [address]       [facility]    Reason for Visit  Meenakshi Mckeon is a 57 year old year old female who is being seen for Interstitial Lung Disease (ILD) (Patient is being seen for ILD follow up)    HPI    Ms. Mckeon is a 56 yo who is here for f/u of chronic eosinophilic pneumonia. She thought that her CEP might be flaring again a couple weeks ago due to lip tingling and fatigue (there are her usual symptoms of CEP recurrence, in addition to eye discomfort).  She also has been having increased left neuropathic chest discomfort, which she has had ever since her XRT.  She will see Dr. Clayton, whom she has seen in the past.  She also will try to restart massage therapy, which has helped in the past.   Denies blisters, cough, shortness of breath, fevers.  She continues to take montelukast.  Stopped beclomethasone due to thrush.  Took fluconazole, and thrush is improved.  She is swimming without problems.    Chest CT 3 mo ago showed new LLL consolidation - prednisone was increased to 20 mg daily and tapered down to 10 mg.  She remains on 10 mg daily.        Current Outpatient Prescriptions   Medication     montelukast (SINGULAIR) 10 MG tablet     predniSONE (DELTASONE) 5 MG tablet     Probiotic Product (PROBIOTIC PO)     VITAMIN D, CHOLECALCIFEROL, PO     Omega-3 Fatty Acids (OMEGA-3 FISH OIL PO)     Zolpidem Tartrate (AMBIEN PO)     No current facility-administered medications for this visit.      Allergies   Allergen Reactions     Kenalog [Triamcinolone] Swelling     Past Medical History:   Diagnosis Date     Atrial fibrillation (H) 10/2014     Cancer (H)     Breast DCIS dx 2013. Lumpectomy and radiation completed 1/2014     Gastro-oesophageal reflux disease      ILD (interstitial lung disease) (H)     possibly associated with radiation therapy, CT-guided needle bx 2014 of LLL consolidation showed organizing  "eosinophlic pneumonia (reviewed by Ethel and Cindy at [facility]). Started prednisone 4/2014.  BAL 6/2014 showed no eos (?if she was on pred at the time).     Lung nodule     7 mm RLL nodule first noted 2016     Right heart enlargement 2/8/2017       Past Surgical History:   Procedure Laterality Date     cubocalneal fusion       KNEE SURGERY       LUMPECTOMY BREAST Left 2013       Social History     Social History     Marital status:      Spouse name: N/A     Number of children: N/A     Years of education: N/A     Occupational History     Not on file.     Social History Main Topics     Smoking status: Former Smoker     Packs/day: 0.50     Years: 20.00     Types: Cigarettes     Quit date: 7/7/2000     Smokeless tobacco: Not on file     Alcohol use 0.0 oz/week     0 Standard drinks or equivalent per week     Drug use: No     Sexual activity: Not on file     Other Topics Concern     Not on file     Social History Narrative    No exposure to asbestos, hot tubs, birds, mold, silica.  Hobbies: gardening, biking.  Nurse at [employer].  .       Family History   Problem Relation Age of Onset     CEREBROVASCULAR DISEASE Mother      Hypertension Mother      C.A.D. Maternal Grandfather      both grfa with MI     Chronic Obstructive Pulmonary Disease Mother              ROS Pulmonary  A complete ROS was otherwise negative except as noted in the HPI.    Vitals: /88 (BP Location: Right arm, Patient Position: Chair, Cuff Size: Adult Regular)  Pulse 68  Resp 16  Ht 1.626 m (5' 4\")  Wt 73.3 kg (161 lb 9.6 oz)  SpO2 97%  BMI 27.74 kg/m2    Exam:   GENERAL APPEARANCE: Well developed, well nourished, alert, and in no apparent distress.  RESP: good air flow throughout.  No crackles. No rhonchi. No wheezes.  CV: Normal S1, S2, regular rhythm, normal rate. No murmur.  No LE edema.   MS: extremities normal. No clubbing. No cyanosis.  SKIN: no rash on limited exam.  No blisters.  NEURO: Mentation intact, " speech normal, normal gait and stance.  PSYCH: mentation appears normal. and affect normal/bright.    Results:  Recent Results (from the past 168 hour(s))   General PFT Lab (Please always keep checked)    Collection Time: 06/30/17  7:09 AM   Result Value Ref Range    FVC-Pred 3.25 L    FVC-Pre 3.04 L    FVC-%Pred-Pre 93 %    FEV1-Pre 2.50 L    FEV1-%Pred-Pre 97 %    FEV1FVC-Pred 80 %    FEV1FVC-Pre 82 %    FEFMax-Pred 6.44 L/sec    FEFMax-Pre 6.87 L/sec    FEFMax-%Pred-Pre 106 %    FEF2575-Pred 2.40 L/sec    FEF2575-Pre 2.54 L/sec    RKG6039-%Pred-Pre 105 %    ExpTime-Pre 7.35 sec    FIFMax-Pre 4.25 L/sec    VC-Pred 3.33 L    VC-Pre 3.20 L    VC-%Pred-Pre 96 %    IC-Pred 2.59 L    IC-Pre 2.99 L    IC-%Pred-Pre 115 %    ERV-Pred 0.74 L    ERV-Pre 0.21 L    ERV-%Pred-Pre 28 %    FEV1FEV6-Pred 81 %    FEV1FEV6-Pre 82 %    FRCPleth-Pred 2.70 L    FRCPleth-Pre 2.12 L    FRCPleth-%Pred-Pre 78 %    RVPleth-Pred 1.86 L    RVPleth-Pre 1.91 L    RVPleth-%Pred-Pre 102 %    TLCPleth-Pred 4.94 L    TLCPleth-Pre 5.11 L    TLCPleth-%Pred-Pre 103 %    DLCOunc-Pred 21.93 ml/min/mmHg    DLCOunc-Pre 21.38 ml/min/mmHg    DLCOunc-%Pred-Pre 97 %    VA-Pre 4.69 L    VA-%Pred-Pre 92 %    FEV1SVC-Pred 77 %    FEV1SVC-Pre 78 %     I reviewed PFT today: normal.  I reviewed chest CT from 6/19: LLL consolidation resolved, no new consolidations.    I reviewed results with the patient.      Assessment and plan:   Mrs. Mckeon is a 57-year-old woman with chronic organizing eosinophilic pneumonia who is here for followup.    1.  Interstitial lung disease.  Her chronic organizing eosinophilic pneumonia is thought to be possibly associated with her prior radiation therapy for breast cancer.  There are a few case reports of this association in other patients.  She was tolerating the prednisone taper well until chest CT that 3 mo ago showed a new consolidated area in the left lower lobe, which was likely a recurrence of the chronic organizing  eosinophilic pneumonia.   The chest CT today shows resolution of the LLL consolidation.  It is clear that her CEP will recur again, and the challenge is finding the correct maintenance dose of prednisone to prevent future flares.  For now, will cont pred 10 mg daily until she RTC in 3 mo.  If she does well, then will try a very slow taper in 3 mo to 5 mg daily.  We have not been able to get insurance approval for mepolizumab.  Mepolizumab is an antibody against IL-5 and inhibits eosinophil function, so there is a scientific rationale for using it for chronic eosinophilic pneumonia.  It does have orphan status for use in hypereosinophilic syndrome by the FDA.  I encouraged her to continue swimming.  She will return in 3 months.  2.  PFF patient registry.  She is currently participating.               Paradise Ge MD

## 2017-06-30 NOTE — NURSING NOTE
Chief Complaint   Patient presents with     Interstitial Lung Disease (ILD)     Patient is being seen for ILD follow up      Zenaida Carrasco CMA at 7:40 AM on 6/30/2017

## 2017-06-30 NOTE — MR AVS SNAPSHOT
After Visit Summary   6/30/2017    Meenakshi Mckeon    MRN: [id number]           Patient Information     Date Of Birth          1960        Visit Information        Provider Department      6/30/2017 8:00 AM Paradise Ge MD [facility]        Care Instructions    Continue prednisone 10 mg daily.  Stop Qvar.          Follow-ups after your visit        Follow-up notes from your care team     Return in about 3 months (around 9/30/2017).      Your next 10 appointments already scheduled     Oct 06, 2017  8:00 AM CDT   (Arrive by 7:45 AM)   Return Interstitial Lung with Paradise Ge MD   [facility] ([facility])    [address]  [address] [facility] [address] [phone]              Who to contact     If you have questions or need follow up information about today's clinic visit or your schedule please contact [facility] directly at [phone].  Normal or non-critical lab and imaging results will be communicated to you by Anaquahart, letter or phone within 4 business days after the clinic has received the results. If you do not hear from us within 7 days, please contact the clinic through Meaningfyt or phone. If you have a critical or abnormal lab result, we will notify you by phone as soon as possible.  Submit refill requests through Shopear or call your pharmacy and they will forward the refill request to us. Please allow 3 business days for your refill to be completed.          Additional Information About Your Visit        Anaquahart Information     Shopear gives you secure access to your electronic health record. If you see a primary care provider, you can also send messages to your care team and make appointments. If you have questions, please call your primary care clinic.  If you do not have a primary care provider, please call [phone] and they will assist  "you.        Care EveryWhere ID     This is your Care EveryWhere ID. This could be used by other organizations to access your [facility] medical records  [id number]        Your Vitals Were     Pulse Respirations Height Pulse Oximetry BMI (Body Mass Index)       68 16 1.626 m (5' 4\") 97% 27.74 kg/m2        Blood Pressure from Last 3 Encounters:   06/30/17 145/88   03/31/17 148/85   02/09/17 138/85    Weight from Last 3 Encounters:   06/30/17 73.3 kg (161 lb 9.6 oz)   03/31/17 72.2 kg (159 lb 3.2 oz)   02/09/17 73.9 kg (162 lb 14.4 oz)              Today, you had the following     No orders found for display         Today's Medication Changes          These changes are accurate as of: 6/30/17  8:10 AM.  If you have any questions, ask your nurse or doctor.               Stop taking these medicines if you haven't already. Please contact your care team if you have questions.     beclomethasone 80 MCG/ACT Inhaler   Commonly known as:  QVAR   Stopped by:  Paradise Ge MD                    Primary Care Provider Office Phone # Fax #    Virgen Graham [phone]       [facility] [address] [place] [address]        Equal Access to Services     SARAHY MORALES AH: Yevgeniy samanoo Sorandee, waaxda luqadaha, qaybta kaalmada cl, marcus fernandez. So [facility] [phone].    ATENCIÓN: Si habla español, tiene a rodriguez disposición servicios gratuitos de asistencia lingüística. Llame al [phone].    We comply with applicable federal civil rights laws and [place] laws. We do not discriminate on the basis of race, color, national origin, age, disability sex, sexual orientation or gender identity.            Thank you!     Thank you for choosing [facility]  for your care. Our goal is always to provide you with excellent care. Hearing back from our patients is one way we can continue to improve our services. Please take a " few minutes to complete the written survey that you may receive in the mail after your visit with us. Thank you!             Your Updated Medication List - Protect others around you: Learn how to safely use, store and throw away your medicines at www.disposemymeds.org.          This list is accurate as of: 6/30/17  8:10 AM.  Always use your most recent med list.                   Brand Name Dispense Instructions for use Diagnosis    AMBIEN PO      Take 10 mg by mouth nightly as needed for sleep        montelukast 10 MG tablet    SINGULAIR    90 tablet    Take 1 tablet (10 mg) by mouth every evening    ILD (interstitial lung disease) (H)       OMEGA-3 FISH OIL PO      Take by mouth daily        predniSONE 5 MG tablet    DELTASONE    120 tablet    20 mg daily for 2 weeks, then 15 mg daily for 2 weeks, then 10 mg daily.    ILD (interstitial lung disease) (H)       PROBIOTIC PO       ILD (interstitial lung disease) (H)       VITAMIN D (CHOLECALCIFEROL) PO      Take by mouth daily

## 2017-06-30 NOTE — PROGRESS NOTES
[facility]    Reason for Visit  Meenakshi Mckeon is a 57 year old year old female who is being seen for Interstitial Lung Disease (ILD) (Patient is being seen for ILD follow up)    HPI    Ms. Mckeon is a 58 yo who is here for f/u of chronic eosinophilic pneumonia. She thought that her CEP might be flaring again a couple weeks ago due to lip tingling and fatigue (there are her usual symptoms of CEP recurrence, in addition to eye discomfort).  She also has been having increased left neuropathic chest discomfort, which she has had ever since her XRT.  She will see Dr. Clayton, whom she has seen in the past.  She also will try to restart massage therapy, which has helped in the past.   Denies blisters, cough, shortness of breath, fevers.  She continues to take montelukast.  Stopped beclomethasone due to thrush.  Took fluconazole, and thrush is improved.  She is swimming without problems.    Chest CT 3 mo ago showed new LLL consolidation - prednisone was increased to 20 mg daily and tapered down to 10 mg.  She remains on 10 mg daily.        Current Outpatient Prescriptions   Medication     montelukast (SINGULAIR) 10 MG tablet     predniSONE (DELTASONE) 5 MG tablet     Probiotic Product (PROBIOTIC PO)     VITAMIN D, CHOLECALCIFEROL, PO     Omega-3 Fatty Acids (OMEGA-3 FISH OIL PO)     Zolpidem Tartrate (AMBIEN PO)     No current facility-administered medications for this visit.      Allergies   Allergen Reactions     Kenalog [Triamcinolone] Swelling     Past Medical History:   Diagnosis Date     Atrial fibrillation (H) 10/2014     Cancer (H)     Breast DCIS dx 2013. Lumpectomy and radiation completed 1/2014     Gastro-oesophageal reflux disease      ILD (interstitial lung disease) (H)     possibly associated with radiation therapy, CT-guided needle bx 2014 of LLL consolidation showed organizing eosinophlic pneumonia (reviewed by Soha at [facility]). Started  "prednisone 4/2014.  BAL 6/2014 showed no eos (?if she was on pred at the time).     Lung nodule     7 mm RLL nodule first noted 2016     Right heart enlargement 2/8/2017       Past Surgical History:   Procedure Laterality Date     cubocalneal fusion       KNEE SURGERY       LUMPECTOMY BREAST Left 2013       Social History     Social History     Marital status:      Spouse name: N/A     Number of children: N/A     Years of education: N/A     Occupational History     Not on file.     Social History Main Topics     Smoking status: Former Smoker     Packs/day: 0.50     Years: 20.00     Types: Cigarettes     Quit date: 7/7/2000     Smokeless tobacco: Not on file     Alcohol use 0.0 oz/week     0 Standard drinks or equivalent per week     Drug use: No     Sexual activity: Not on file     Other Topics Concern     Not on file     Social History Narrative    No exposure to asbestos, hot tubs, birds, mold, silica.  Hobbies: gardening, biking.  Nurse at [employer].  .       Family History   Problem Relation Age of Onset     CEREBROVASCULAR DISEASE Mother      Hypertension Mother      C.A.D. Maternal Grandfather      both grfa with MI     Chronic Obstructive Pulmonary Disease Mother              ROS Pulmonary  A complete ROS was otherwise negative except as noted in the HPI.    Vitals: /88 (BP Location: Right arm, Patient Position: Chair, Cuff Size: Adult Regular)  Pulse 68  Resp 16  Ht 1.626 m (5' 4\")  Wt 73.3 kg (161 lb 9.6 oz)  SpO2 97%  BMI 27.74 kg/m2    Exam:   GENERAL APPEARANCE: Well developed, well nourished, alert, and in no apparent distress.  RESP: good air flow throughout.  No crackles. No rhonchi. No wheezes.  CV: Normal S1, S2, regular rhythm, normal rate. No murmur.  No LE edema.   MS: extremities normal. No clubbing. No cyanosis.  SKIN: no rash on limited exam.  No blisters.  NEURO: Mentation intact, speech normal, normal gait and stance.  PSYCH: mentation appears normal. and " affect normal/bright.    Results:  Recent Results (from the past 168 hour(s))   General PFT Lab (Please always keep checked)    Collection Time: 06/30/17  7:09 AM   Result Value Ref Range    FVC-Pred 3.25 L    FVC-Pre 3.04 L    FVC-%Pred-Pre 93 %    FEV1-Pre 2.50 L    FEV1-%Pred-Pre 97 %    FEV1FVC-Pred 80 %    FEV1FVC-Pre 82 %    FEFMax-Pred 6.44 L/sec    FEFMax-Pre 6.87 L/sec    FEFMax-%Pred-Pre 106 %    FEF2575-Pred 2.40 L/sec    FEF2575-Pre 2.54 L/sec    SYS9722-%Pred-Pre 105 %    ExpTime-Pre 7.35 sec    FIFMax-Pre 4.25 L/sec    VC-Pred 3.33 L    VC-Pre 3.20 L    VC-%Pred-Pre 96 %    IC-Pred 2.59 L    IC-Pre 2.99 L    IC-%Pred-Pre 115 %    ERV-Pred 0.74 L    ERV-Pre 0.21 L    ERV-%Pred-Pre 28 %    FEV1FEV6-Pred 81 %    FEV1FEV6-Pre 82 %    FRCPleth-Pred 2.70 L    FRCPleth-Pre 2.12 L    FRCPleth-%Pred-Pre 78 %    RVPleth-Pred 1.86 L    RVPleth-Pre 1.91 L    RVPleth-%Pred-Pre 102 %    TLCPleth-Pred 4.94 L    TLCPleth-Pre 5.11 L    TLCPleth-%Pred-Pre 103 %    DLCOunc-Pred 21.93 ml/min/mmHg    DLCOunc-Pre 21.38 ml/min/mmHg    DLCOunc-%Pred-Pre 97 %    VA-Pre 4.69 L    VA-%Pred-Pre 92 %    FEV1SVC-Pred 77 %    FEV1SVC-Pre 78 %     I reviewed PFT today: normal.  I reviewed chest CT from 6/19: LLL consolidation resolved, no new consolidations.    I reviewed results with the patient.      Assessment and plan:   Mrs. Mckeon is a 57-year-old woman with chronic organizing eosinophilic pneumonia who is here for followup.    1.  Interstitial lung disease.  Her chronic organizing eosinophilic pneumonia is thought to be possibly associated with her prior radiation therapy for breast cancer.  There are a few case reports of this association in other patients.  She was tolerating the prednisone taper well until chest CT that 3 mo ago showed a new consolidated area in the left lower lobe, which was likely a recurrence of the chronic organizing eosinophilic pneumonia.  The chest CT today shows resolution of the LLL consolidation.   It is clear that her CEP will recur again, and the challenge is finding the correct maintenance dose of prednisone to prevent future flares.  For now, will cont pred 10 mg daily until she RTC in 3 mo.  If she does well, then will try a very slow taper in 3 mo to 5 mg daily.  We have not been able to get insurance approval for mepolizumab.  Mepolizumab is an antibody against IL-5 and inhibits eosinophil function, so there is a scientific rationale for using it for chronic eosinophilic pneumonia.  It does have orphan status for use in hypereosinophilic syndrome by the FDA.  I encouraged her to continue swimming.  She will return in 3 months.  2.  PFF patient registry.  She is currently participating.

## 2017-10-01 ASSESSMENT — ENCOUNTER SYMPTOMS
MUSCLE CRAMPS: 0
ABDOMINAL PAIN: 0
JAUNDICE: 0
BOWEL INCONTINENCE: 0
MUSCLE WEAKNESS: 0
HYPERTENSION: 0
DIARRHEA: 0
ORTHOPNEA: 0
ARTHRALGIAS: 1
CLAUDICATION: 0
VOMITING: 0
SLEEP DISTURBANCES DUE TO BREATHING: 0
MYALGIAS: 0
STIFFNESS: 0
RECTAL PAIN: 0
LIGHT-HEADEDNESS: 0
TACHYCARDIA: 1
HYPOTENSION: 0
RECTAL BLEEDING: 0
PALPITATIONS: 1
CONSTIPATION: 0
BACK PAIN: 0
LEG SWELLING: 0
NECK PAIN: 0
EXERCISE INTOLERANCE: 0
JOINT SWELLING: 0
HEARTBURN: 1
SYNCOPE: 0
LEG PAIN: 0
BLOOD IN STOOL: 0
NAUSEA: 0
BLOATING: 0

## 2017-10-06 ENCOUNTER — OFFICE VISIT (OUTPATIENT)
Dept: PULMONOLOGY | Facility: CLINIC | Age: 57
End: 2017-10-06
Attending: INTERNAL MEDICINE
Payer: COMMERCIAL

## 2017-10-06 VITALS
HEART RATE: 80 BPM | WEIGHT: 160 LBS | BODY MASS INDEX: 27.31 KG/M2 | DIASTOLIC BLOOD PRESSURE: 89 MMHG | RESPIRATION RATE: 18 BRPM | SYSTOLIC BLOOD PRESSURE: 144 MMHG | OXYGEN SATURATION: 99 % | HEIGHT: 64 IN

## 2017-10-06 DIAGNOSIS — J84.9 ILD (INTERSTITIAL LUNG DISEASE) (H): Primary | ICD-10-CM

## 2017-10-06 PROCEDURE — 99212 OFFICE O/P EST SF 10 MIN: CPT | Mod: ZF

## 2017-10-06 ASSESSMENT — PAIN SCALES - GENERAL: PAINLEVEL: NO PAIN (0)

## 2017-10-06 NOTE — PROGRESS NOTES
[facility] Interstitial Lung Disease [facility]    Reason for Visit  Meenakshi Mckeon is a 57 year old year old female who is being seen for RECHECK (Follow up on Meenakshi and her lung issues)    HPI    Ms. Mckeon is a 58 yo who is here for f/u of chronic eosinophilic pneumonia. The last recurrence of CEP occurred 6 mo ago, when she had lip tingling and fatigue (there are her usual symptoms of CEP recurrence, in addition to eye discomfort).  Chest CT at that time showed new LLL consolidation that improved with another increase in prednisone dose.  She has had multiple CEP recurrences, and she thinks they happen when prednisone is <5 mg daily.  Her insurance has refused to pay for mepolizumab as a steroid sparing agent.  She is swimming and does yoga and continues to work part time.  Denies cough or fevers.  She continues to take montelukast, and her current prednisone dose is 10 mg daily.  We stopped beclomethasone earlier this year due to thrush.  She received the flu vaccine.           Current Outpatient Prescriptions   Medication     montelukast (SINGULAIR) 10 MG tablet     predniSONE (DELTASONE) 5 MG tablet     Probiotic Product (PROBIOTIC PO)     VITAMIN D, CHOLECALCIFEROL, PO     Omega-3 Fatty Acids (OMEGA-3 FISH OIL PO)     Zolpidem Tartrate (AMBIEN PO)     No current facility-administered medications for this visit.      Allergies   Allergen Reactions     Kenalog [Triamcinolone] Swelling     Past Medical History:   Diagnosis Date     Atrial fibrillation (H) 10/2014     Cancer (H)     Breast DCIS dx 2013. Lumpectomy and radiation completed 1/2014     Gastro-oesophageal reflux disease      ILD (interstitial lung disease) (H)     chronic eosinophilic pneumonia associated with radiation therapy, CT-guided needle bx 2014 of LLL consolidation showed organizing eosinophlic pneumonia (reviewed by Soha at [facility]). Started prednisone 4/2014.  BAL 6/2014 showed no eos (?if she was on pred at the  "timeL; several flares as pred has been tapered     Lung nodule     7 mm RLL nodule first noted 2016     Right heart enlargement 2/8/2017       Past Surgical History:   Procedure Laterality Date     cubocalneal fusion       KNEE SURGERY       LUMPECTOMY BREAST Left 2013       Social History     Social History     Marital status:      Spouse name: N/A     Number of children: N/A     Years of education: N/A     Occupational History     Not on file.     Social History Main Topics     Smoking status: Former Smoker     Packs/day: 0.50     Years: 20.00     Types: Cigarettes     Quit date: 7/7/2000     Smokeless tobacco: Never Used     Alcohol use 0.0 oz/week     0 Standard drinks or equivalent per week     Drug use: No     Sexual activity: Not on file     Other Topics Concern     Not on file     Social History Narrative    No exposure to asbestos, hot tubs, birds, mold, silica.  Hobbies: gardening, biking.  Nurse at [employer].  .       Family History   Problem Relation Age of Onset     CEREBROVASCULAR DISEASE Mother      Hypertension Mother      Chronic Obstructive Pulmonary Disease Mother      C.A.D. Maternal Grandfather      both grfa with MI             ROS Pulmonary  A complete ROS was otherwise negative except as noted in the HPI.    Vitals: /89  Pulse 80  Resp 18  Ht 1.626 m (5' 4\")  Wt 72.6 kg (160 lb)  SpO2 99%  BMI 27.46 kg/m2    Exam:   GENERAL APPEARANCE: Well developed, well nourished, alert, and in no apparent distress.  RESP: good air flow throughout.  No crackles. No rhonchi. No wheezes.  CV: Normal S1, S2, regular rhythm, normal rate. No murmur.  No LE edema.   MS: extremities normal. No clubbing. No cyanosis.  SKIN: no rash on limited exam.  NEURO: Mentation intact, speech normal, normal gait and stance.  PSYCH: mentation appears normal. and affect normal/bright.      Assessment and plan:   Mrs. Mckeon is a 57-year-old woman with chronic organizing eosinophilic pneumonia " who is here for followup.    1.  Interstitial lung disease.  Her chronic organizing eosinophilic pneumonia is probably associated with her prior radiation therapy for breast cancer.  There are a few case reports of this association.  She has had no recurrent symptoms of CEP on prednisone 10 mg daily, and thinks that her recurrences happen when she is on less than 5 mg daily.  It is clear that her CEP will recur again, and the challenge is finding the correct maintenance dose of prednisone to prevent future flares.  I will slowly taper prednisone by 1 mg each month until she reaches 5 mg daily.  Mepolizumab is an antibody against IL-5 and inhibits eosinophil function, so there is a scientific rationale for using it for chronic eosinophilic pneumonia, however her insurance will not pay for it.  It does have orphan status for use in hypereosinophilic syndrome by the FDA.  I encouraged her to continue swimming and yoga.  She will return in 3 months and notify us in the meantime if her symptoms recur.  2.  PFF patient registry.  She is currently participating.   3.  Health care maintenance.  She will continue vit D, and I recommended starting calcium and talking with her PCP about osteoporosis prevention.

## 2017-10-06 NOTE — LETTER
10/6/2017      RE: Meenakshi Mckeon  [address]  [facility] [address]       [facility]    Reason for Visit  Meenakshi Mckeon is a 57 year old year old female who is being seen for RECHECK (Follow up on Meenakshi and her lung issues)    HPI    Ms. Mckeon is a 58 yo who is here for f/u of chronic eosinophilic pneumonia. The last recurrence of CEP occurred 6 mo ago, when she had lip tingling and fatigue (there are her usual symptoms of CEP recurrence, in addition to eye discomfort).   Chest CT at that time showed new LLL consolidation that improved with another increase in prednisone dose.  She has had multiple CEP recurrences, and she thinks they happen when prednisone is <5 mg daily.  Her insurance has refused to pay for mepolizumab as a steroid sparing agent.  She is swimming and does yoga and continues to work part time.  Denies cough or fevers.  She continues to take montelukast, and her current prednisone dose is 10 mg daily.   We stopped beclomethasone earlier this year due to thrush.   She received the flu vaccine.           Current Outpatient Prescriptions   Medication     montelukast (SINGULAIR) 10 MG tablet     predniSONE (DELTASONE) 5 MG tablet     Probiotic Product (PROBIOTIC PO)     VITAMIN D, CHOLECALCIFEROL, PO     Omega-3 Fatty Acids (OMEGA-3 FISH OIL PO)     Zolpidem Tartrate (AMBIEN PO)     No current facility-administered medications for this visit.      Allergies   Allergen Reactions     Kenalog [Triamcinolone] Swelling     Past Medical History:   Diagnosis Date     Atrial fibrillation (H) 10/2014     Cancer (H)     Breast DCIS dx 2013. Lumpectomy and radiation completed 1/2014     Gastro-oesophageal reflux disease      ILD (interstitial lung disease) (H)     chronic eosinophilic pneumonia associated with radiation therapy, CT-guided needle bx 2014 of LLL consolidation showed organizing eosinophlic pneumonia (reviewed by Ethel and Cindy at [facility]).  "Started prednisone 4/2014.  BAL 6/2014 showed no eos (?if she was on pred at the timeL; several flares as pred has been tapered     Lung nodule     7 mm RLL nodule first noted 2016     Right heart enlargement 2/8/2017       Past Surgical History:   Procedure Laterality Date     cubocalneal fusion       KNEE SURGERY       LUMPECTOMY BREAST Left 2013       Social History     Social History     Marital status:      Spouse name: N/A     Number of children: N/A     Years of education: N/A     Occupational History     Not on file.     Social History Main Topics     Smoking status: Former Smoker     Packs/day: 0.50     Years: 20.00     Types: Cigarettes     Quit date: 7/7/2000     Smokeless tobacco: Never Used     Alcohol use 0.0 oz/week     0 Standard drinks or equivalent per week     Drug use: No     Sexual activity: Not on file     Other Topics Concern     Not on file     Social History Narrative    No exposure to asbestos, hot tubs, birds, mold, silica.  Hobbies: gardening, biking.  Nurse at [employer].  .       Family History   Problem Relation Age of Onset     CEREBROVASCULAR DISEASE Mother      Hypertension Mother      Chronic Obstructive Pulmonary Disease Mother      C.A.D. Maternal Grandfather      both grfa with MI             ROS Pulmonary  A complete ROS was otherwise negative except as noted in the HPI.    Vitals: /89  Pulse 80  Resp 18  Ht 1.626 m (5' 4\")  Wt 72.6 kg (160 lb)  SpO2 99%  BMI 27.46 kg/m2    Exam:   GENERAL APPEARANCE: Well developed, well nourished, alert, and in no apparent distress.  RESP: good air flow throughout.  No crackles. No rhonchi. No wheezes.  CV: Normal S1, S2, regular rhythm, normal rate. No murmur.  No LE edema.   MS: extremities normal. No clubbing. No cyanosis.  SKIN: no rash on limited exam.  NEURO: Mentation intact, speech normal, normal gait and stance.  PSYCH: mentation appears normal. and affect normal/bright.      Assessment and plan: "   Mrs. Mckeon is a 57-year-old woman with chronic organizing eosinophilic pneumonia who is here for followup.    1.  Interstitial lung disease.  Her chronic organizing eosinophilic pneumonia is probably associated with her prior radiation therapy for breast cancer.  There are a few case reports of this association.  She has had no recurrent symptoms of CEP on prednisone 10 mg daily, and thinks that her recurrences happen when she is on less than 5 mg daily.  It is clear that her CEP will recur again, and the challenge is finding the correct maintenance dose of prednisone to prevent future flares.   I will slowly taper prednisone by 1 mg each month until she reaches 5 mg daily.  Mepolizumab is an antibody against IL-5 and inhibits eosinophil function, so there is a scientific rationale for using it for chronic eosinophilic pneumonia, however her insurance will not pay for it.  It does have orphan status for use in hypereosinophilic syndrome by the FDA.  I encouraged her to continue swimming and yoga.  She will return in 3 months and notify us in the meantime if her symptoms recur.  2.  PFF patient registry.  She is currently participating.   3.  Health care maintenance.  She will continue vit D, and I recommended starting calcium and talking with her PCP about osteoporosis prevention.          Paradise Ge MD

## 2017-10-06 NOTE — NURSING NOTE
Chief Complaint   Patient presents with     RECHECK     Follow up on Meenakshi and her lung issues     [place] CMA at 7:49 AM on 10/6/2017

## 2017-10-06 NOTE — MR AVS SNAPSHOT
After Visit Summary   10/6/2017    Meenakshi Mckeon    MRN: [id number]           Patient Information     Date Of Birth          1960        Visit Information        Provider Department      10/6/2017 8:00 AM Paradise Ge MD [facility]        Today's Diagnoses     ILD (interstitial lung disease) (H)    -  1       Follow-ups after your visit        Your next 10 appointments already scheduled     Jan 05, 2018  8:00 AM CST   (Arrive by 7:45 AM)   Return Interstitial Lung with Paradise Ge MD   [facility] ([facility])    [address]  [address] [facility] [address] [phone]              Who to contact     If you have questions or need follow up information about today's clinic visit or your schedule please contact [facility] directly at [phone].  Normal or non-critical lab and imaging results will be communicated to you by MyChart, letter or phone within 4 business days after the clinic has received the results. If you do not hear from us within 7 days, please contact the clinic through Jaunthart or phone. If you have a critical or abnormal lab result, we will notify you by phone as soon as possible.  Submit refill requests through Nuvosun or call your pharmacy and they will forward the refill request to us. Please allow 3 business days for your refill to be completed.          Additional Information About Your Visit        MyChart Information     Nuvosun gives you secure access to your electronic health record. If you see a primary care provider, you can also send messages to your care team and make appointments. If you have questions, please call your primary care clinic.  If you do not have a primary care provider, please call [phone] and they will assist you.        Care EveryWhere ID     This is your Care EveryWhere ID. This could be used by other  "organizations to access your [place] medical records  [id number]        Your Vitals Were     Pulse Respirations Height Pulse Oximetry BMI (Body Mass Index)       80 18 1.626 m (5' 4\") 99% 27.46 kg/m2        Blood Pressure from Last 3 Encounters:   10/06/17 144/89   06/30/17 145/88   03/31/17 148/85    Weight from Last 3 Encounters:   10/06/17 72.6 kg (160 lb)   06/30/17 73.3 kg (161 lb 9.6 oz)   03/31/17 72.2 kg (159 lb 3.2 oz)              Today, you had the following     No orders found for display       Primary Care Provider Office Phone # Fax #    Virgen Graham [phone]       [facility] [address] [place] [address]        Equal Access to Services     [facility]: Hadii aad ku hadasho Soomaali, waaxda luqadaha, qaybta kaalmada adeegyada, waxay rohanin hayaan yadiel alvarado . So [facility] [phone].    ATENCIÓN: Si habla español, tiene a rodriguez disposición servicios gratuitos de asistencia lingüística. Llame al [phone].    We comply with applicable federal civil rights laws and [place] laws. We do not discriminate on the basis of race, color, national origin, age, disability, sex, sexual orientation, or gender identity.            Thank you!     Thank you for choosing [facility]  for your care. Our goal is always to provide you with excellent care. Hearing back from our patients is one way we can continue to improve our services. Please take a few minutes to complete the written survey that you may receive in the mail after your visit with us. Thank you!             Your Updated Medication List - Protect others around you: Learn how to safely use, store and throw away your medicines at www.disposemymeds.org.          This list is accurate as of: 10/6/17 11:41 AM.  Always use your most recent med list.                   Brand Name Dispense Instructions for use Diagnosis    AMBIEN PO      Take 10 mg by mouth nightly as " needed for sleep        montelukast 10 MG tablet    SINGULAIR    90 tablet    Take 1 tablet (10 mg) by mouth every evening    ILD (interstitial lung disease) (H)       OMEGA-3 FISH OIL PO      Take by mouth daily        predniSONE 5 MG tablet    DELTASONE    120 tablet    20 mg daily for 2 weeks, then 15 mg daily for 2 weeks, then 10 mg daily.    ILD (interstitial lung disease) (H)       PROBIOTIC PO       ILD (interstitial lung disease) (H)       VITAMIN D (CHOLECALCIFEROL) PO      Take by mouth daily

## 2017-10-09 LAB
DLCOUNC-%PRED-PRE: 97 %
DLCOUNC-PRE: 21.38 ML/MIN/MMHG
DLCOUNC-PRED: 21.93 ML/MIN/MMHG
ERV-%PRED-PRE: 28 %
ERV-PRE: 0.21 L
ERV-PRED: 0.74 L
EXPTIME-PRE: 7.35 SEC
FEF2575-%PRED-PRE: 105 %
FEF2575-PRE: 2.54 L/SEC
FEF2575-PRED: 2.4 L/SEC
FEFMAX-%PRED-PRE: 106 %
FEFMAX-PRE: 6.87 L/SEC
FEFMAX-PRED: 6.44 L/SEC
FEV1-%PRED-PRE: 97 %
FEV1-PRE: 2.5 L
FEV1FEV6-PRE: 82 %
FEV1FEV6-PRED: 81 %
FEV1FVC-PRE: 82 %
FEV1FVC-PRED: 80 %
FEV1SVC-PRE: 78 %
FEV1SVC-PRED: 77 %
FIFMAX-PRE: 4.25 L/SEC
FRCPLETH-%PRED-PRE: 78 %
FRCPLETH-PRE: 2.12 L
FRCPLETH-PRED: 2.7 L
FVC-%PRED-PRE: 93 %
FVC-PRE: 3.04 L
FVC-PRED: 3.25 L
IC-%PRED-PRE: 115 %
IC-PRE: 2.99 L
IC-PRED: 2.59 L
RVPLETH-%PRED-PRE: 102 %
RVPLETH-PRE: 1.91 L
RVPLETH-PRED: 1.86 L
TLCPLETH-%PRED-PRE: 103 %
TLCPLETH-PRE: 5.11 L
TLCPLETH-PRED: 4.94 L
VA-%PRED-PRE: 92 %
VA-PRE: 4.69 L
VC-%PRED-PRE: 96 %
VC-PRE: 3.2 L
VC-PRED: 3.33 L

## 2018-01-05 ENCOUNTER — OFFICE VISIT (OUTPATIENT)
Dept: PULMONOLOGY | Facility: CLINIC | Age: 58
End: 2018-01-05
Attending: INTERNAL MEDICINE
Payer: COMMERCIAL

## 2018-01-05 VITALS
RESPIRATION RATE: 16 BRPM | SYSTOLIC BLOOD PRESSURE: 143 MMHG | OXYGEN SATURATION: 99 % | DIASTOLIC BLOOD PRESSURE: 92 MMHG | HEART RATE: 79 BPM

## 2018-01-05 DIAGNOSIS — J84.9 ILD (INTERSTITIAL LUNG DISEASE) (H): Primary | ICD-10-CM

## 2018-01-05 PROCEDURE — G0463 HOSPITAL OUTPT CLINIC VISIT: HCPCS | Mod: ZF

## 2018-01-05 ASSESSMENT — PAIN SCALES - GENERAL: PAINLEVEL: NO PAIN (0)

## 2018-01-05 NOTE — NURSING NOTE
Chief Complaint   Patient presents with     Interstitial Lung Disease (ILD)     Patient is being seen for ILD follow up      Zenaida Carrasco CMA at 7:54 AM on 1/5/2018

## 2018-01-05 NOTE — PROGRESS NOTES
Medication pending, pharmacy loaded.   [facility]    Reason for Visit  Meenakshi Mckeon is a 57 year old year old female who is being seen for Interstitial Lung Disease (ILD) (Patient is being seen for ILD follow up)    HPI    57 year old female with hx of chronic eosinophilic pneumonia who is here for follow up. She was last seen in October, at which time she remained on prednisone 10 mg daily. Since that visit she has remained flare free and doing quite well. Her current prednisone dose is 7 mg but is due to decrease to 6 mg today. She remains very active and has not had any issues with dyspnea. She also remains on Singulair. Denies cough, SOB or other systemic symptoms.         Current Outpatient Prescriptions   Medication     IBUPROFEN PO     montelukast (SINGULAIR) 10 MG tablet     predniSONE (DELTASONE) 5 MG tablet     Probiotic Product (PROBIOTIC PO)     VITAMIN D, CHOLECALCIFEROL, PO     Omega-3 Fatty Acids (OMEGA-3 FISH OIL PO)     Zolpidem Tartrate (AMBIEN PO)     No current facility-administered medications for this visit.      Allergies   Allergen Reactions     Kenalog [Triamcinolone] Swelling     Past Medical History:   Diagnosis Date     Atrial fibrillation (H) 10/2014     Cancer (H)     Breast DCIS dx 2013. Lumpectomy and radiation completed 1/2014     Gastro-oesophageal reflux disease      ILD (interstitial lung disease) (H)     chronic eosinophilic pneumonia associated with radiation therapy, CT-guided needle bx 2014 of LLL consolidation showed organizing eosinophlic pneumonia (reviewed by Soha at [facility]). Started prednisone 4/2014.  BAL 6/2014 showed no eos (?if she was on pred at the timeL; several flares as pred has been tapered     Lung nodule     7 mm RLL nodule first noted 2016     Right heart enlargement 2/8/2017       Past Surgical History:   Procedure Laterality Date     cubocalneal fusion       KNEE SURGERY       LUMPECTOMY BREAST Left 2013       Social History      Social History     Marital status:      Spouse name: N/A     Number of children: N/A     Years of education: N/A     Occupational History     Not on file.     Social History Main Topics     Smoking status: Former Smoker     Packs/day: 0.50     Years: 20.00     Types: Cigarettes     Quit date: 7/7/2000     Smokeless tobacco: Never Used     Alcohol use 0.0 oz/week     0 Standard drinks or equivalent per week     Drug use: No     Sexual activity: Not on file     Other Topics Concern     Not on file     Social History Narrative    No exposure to asbestos, hot tubs, birds, mold, silica.  Hobbies: gardening, biking.  Nurse at [employer].  .       Family History   Problem Relation Age of Onset     CEREBROVASCULAR DISEASE Mother      Hypertension Mother      Chronic Obstructive Pulmonary Disease Mother      C.A.D. Maternal Grandfather      both grfa with MI             ROS Pulmonary  A complete ROS was otherwise negative except as noted in the HPI.    Vitals: BP (!) 143/92 (BP Location: Right arm, Patient Position: Chair, Cuff Size: Adult Regular)  Pulse 79  Resp 16  SpO2 99%    Exam:   GENERAL APPEARANCE: Well developed, well nourished, alert, and in no apparent distress.  LYMPHATICS: No significant cervical, or supraclavicular nodes.  RESP: good air flow throughout.  No crackles. No rhonchi. No wheezes.  CV: Normal S1, S2, regular rhythm, normal rate. No murmur.  No LE edema.   ABD: BS+, soft, nontender.  MS: extremities normal. No clubbing. No cyanosis.  SKIN: no rash on limited exam.  NEURO: Mentation intact, speech normal, normal gait and stance.  PSYCH: mentation appears normal. and affect normal/bright.      Assessment and plan:   57 year old female with hx of chronic eosinophilic pneumonia who is here for follow up.  1. Interstitial lung disease. Known chronic eosinophilic pneumonia without evidence of flare since March 2017. Her LLL consolidation has since resolved on Chest CT. She  remains on prednisone, and we have been tapering slowly. She is currently at 7 mg today but is due to decrease to 6 mg. Given her stable clinical status, would decrease her prednisone to 6 mg with plans to taper to 5 mg in a month. She has consistently flared when her dose has been below 5 mg, so would be reasonable to not taper below 5 mg at this time. She should remain on her Singulair, but if her symptoms are stable, can consider stopping in the future.  Her insurance has denied coverage for mepolizumab, which otherwise would be an option for treatment.  Return to clinic in 4 months.

## 2018-01-05 NOTE — MR AVS SNAPSHOT
After Visit Summary   1/5/2018    Meenakshi Mckeon    MRN: [id number]           Patient Information     Date Of Birth          1960        Visit Information        Provider Department      1/5/2018 8:00 AM Paradise Ge MD [facility]        Care Instructions    Taper prednisone to 6 mg now   Taper to 5 mg in 1 month    Return to clinic in 4 months           Follow-ups after your visit        Follow-up notes from your care team     Return in about 4 months (around 5/5/2018).      Your next 10 appointments already scheduled     May 04, 2018  8:00 AM CDT   (Arrive by 7:45 AM)   Return Interstitial Lung with Paradise Ge MD   [facility] ([facility] [place])    [address]  [address] [place] [address] [phone]              Who to contact     If you have questions or need follow up information about today's clinic visit or your schedule please contact [facility] directly at [phone].  Normal or non-critical lab and imaging results will be communicated to you by MyChart, letter or phone within 4 business days after the clinic has received the results. If you do not hear from us within 7 days, please contact the clinic through Suiteyhart or phone. If you have a critical or abnormal lab result, we will notify you by phone as soon as possible.  Submit refill requests through Linktone or call your pharmacy and they will forward the refill request to us. Please allow 3 business days for your refill to be completed.          Additional Information About Your Visit        MyChart Information     Linktone gives you secure access to your electronic health record. If you see a primary care provider, you can also send messages to your care team and make appointments. If you have questions, please call your primary care clinic.  If you do not have a primary care provider, please  call [phone] and they will assist you.        Care EveryWhere ID     This is your Care EveryWhere ID. This could be used by other organizations to access your [place] medical records  [id number]        Your Vitals Were     Pulse Respirations Pulse Oximetry             79 16 99%          Blood Pressure from Last 3 Encounters:   01/05/18 (!) 143/92   10/06/17 144/89   06/30/17 145/88    Weight from Last 3 Encounters:   10/06/17 72.6 kg (160 lb)   06/30/17 73.3 kg (161 lb 9.6 oz)   03/31/17 72.2 kg (159 lb 3.2 oz)              Today, you had the following     No orders found for display       Primary Care Provider Office Phone # Fax #    Virgen Graham [phone]       [facility] [address] [place] [address]        Equal Access to Services     [facility] MORAL[facility] : Hadii aad ku hadasho Soomaali, waaxda luqadaha, qaybta kaalmada adeegyada, waxay rohanin haylilin yadiel alvarado . So [facility] [phone].    ATENCIÓN: Si habla español, tiene a rodriguez disposición servicios gratuitos de asistencia lingüística. Llame al [phone].    We comply with applicable federal civil rights laws and [place] laws. We do not discriminate on the basis of race, color, national origin, age, disability, sex, sexual orientation, or gender identity.            Thank you!     Thank you for choosing [facility]  for your care. Our goal is always to provide you with excellent care. Hearing back from our patients is one way we can continue to improve our services. Please take a few minutes to complete the written survey that you may receive in the mail after your visit with us. Thank you!             Your Updated Medication List - Protect others around you: Learn how to safely use, store and throw away your medicines at www.disposemymeds.org.          This list is accurate as of: 1/5/18  8:24 AM.  Always use your most recent med list.                   Brand Name  Dispense Instructions for use Diagnosis    AMBIEN PO      Take 10 mg by mouth nightly as needed for sleep        IBUPROFEN PO      Take 400 mg by mouth        montelukast 10 MG tablet    SINGULAIR    90 tablet    Take 1 tablet (10 mg) by mouth every evening    ILD (interstitial lung disease) (H)       OMEGA-3 FISH OIL PO      Take by mouth daily        predniSONE 5 MG tablet    DELTASONE    120 tablet    20 mg daily for 2 weeks, then 15 mg daily for 2 weeks, then 10 mg daily.    ILD (interstitial lung disease) (H)       PROBIOTIC PO       ILD (interstitial lung disease) (H)       VITAMIN D (CHOLECALCIFEROL) PO      Take by mouth daily

## 2018-01-24 ENCOUNTER — CARE COORDINATION (OUTPATIENT)
Dept: PULMONOLOGY | Facility: CLINIC | Age: 58
End: 2018-01-24

## 2018-01-24 DIAGNOSIS — J45.909 REACTIVE AIRWAY DISEASE WITH WHEEZING: Primary | ICD-10-CM

## 2018-01-24 RX ORDER — ALBUTEROL SULFATE 90 UG/1
2 AEROSOL, METERED RESPIRATORY (INHALATION) EVERY 6 HOURS
Qty: 1 INHALER | Refills: 1 | Status: SHIPPED | OUTPATIENT
Start: 2018-01-24 | End: 2022-02-10

## 2018-03-26 ENCOUNTER — CARE COORDINATION (OUTPATIENT)
Dept: PULMONOLOGY | Facility: CLINIC | Age: 58
End: 2018-03-26

## 2018-03-26 NOTE — PROGRESS NOTES
"Telephone Encounter: Incoming    Reason for Call: Started feeling like symptoms are increasing slightly. Now down to 6 mg Prednisone daily. Increased fatigue, exhausted by the end of the day. Hearing crackles in upper airways, sounds like \"rice crispy sound\" coming out of mouth. Wondering about increasing Prednisone back to 10 mg back until follow up.     Nursing Action/Patient Instruction: Discussed with Dr Ge, yes thinks she should go back to 10 mg. Informed patient.     Patient Response/Questions/Concerns: Agreed with plan.   "

## 2018-04-21 ASSESSMENT — ENCOUNTER SYMPTOMS
SYNCOPE: 0
FATIGUE: 1
BACK PAIN: 1
EYE PAIN: 0
HYPOTENSION: 0
JOINT SWELLING: 1
HYPERTENSION: 0
DIARRHEA: 0
POSTURAL DYSPNEA: 0
SLEEP DISTURBANCES DUE TO BREATHING: 0
DOUBLE VISION: 0
LEG PAIN: 0
INCREASED ENERGY: 1
SHORTNESS OF BREATH: 0
JAUNDICE: 0
HEARTBURN: 1
SNORES LOUDLY: 1
CONSTIPATION: 0
HEMOPTYSIS: 0
DYSPNEA ON EXERTION: 0
ORTHOPNEA: 0
BLOOD IN STOOL: 0
BLOATING: 0
EYE REDNESS: 0
STIFFNESS: 1
WHEEZING: 1
PALPITATIONS: 1
SPUTUM PRODUCTION: 0
MUSCLE WEAKNESS: 1
MUSCLE CRAMPS: 0
NAUSEA: 0
EXERCISE INTOLERANCE: 0
EYE WATERING: 0
COUGH DISTURBING SLEEP: 0
VOMITING: 0
MYALGIAS: 1
RECTAL PAIN: 0
ABDOMINAL PAIN: 0
ARTHRALGIAS: 1
EYE IRRITATION: 1
BOWEL INCONTINENCE: 0
NECK PAIN: 0
LIGHT-HEADEDNESS: 0
COUGH: 0

## 2018-05-04 ENCOUNTER — RADIANT APPOINTMENT (OUTPATIENT)
Dept: CT IMAGING | Facility: CLINIC | Age: 58
End: 2018-05-04
Attending: INTERNAL MEDICINE
Payer: COMMERCIAL

## 2018-05-04 ENCOUNTER — CARE COORDINATION (OUTPATIENT)
Dept: PULMONOLOGY | Facility: CLINIC | Age: 58
End: 2018-05-04

## 2018-05-04 ENCOUNTER — OFFICE VISIT (OUTPATIENT)
Dept: PULMONOLOGY | Facility: CLINIC | Age: 58
End: 2018-05-04
Attending: INTERNAL MEDICINE
Payer: COMMERCIAL

## 2018-05-04 VITALS
DIASTOLIC BLOOD PRESSURE: 86 MMHG | HEART RATE: 83 BPM | RESPIRATION RATE: 16 BRPM | SYSTOLIC BLOOD PRESSURE: 144 MMHG | OXYGEN SATURATION: 98 %

## 2018-05-04 DIAGNOSIS — J82.81 EOSINOPHILIC PNEUMONIA (H): Primary | ICD-10-CM

## 2018-05-04 DIAGNOSIS — J82.81 EOSINOPHILIC PNEUMONIA (H): ICD-10-CM

## 2018-05-04 DIAGNOSIS — J84.9 ILD (INTERSTITIAL LUNG DISEASE) (H): ICD-10-CM

## 2018-05-04 LAB
BASOPHILS # BLD AUTO: 0 10E9/L (ref 0–0.2)
BASOPHILS NFR BLD AUTO: 0.5 %
DIFFERENTIAL METHOD BLD: NORMAL
EOSINOPHIL # BLD AUTO: 0.1 10E9/L (ref 0–0.7)
EOSINOPHIL NFR BLD AUTO: 0.6 %
ERYTHROCYTE [DISTWIDTH] IN BLOOD BY AUTOMATED COUNT: 12.3 % (ref 10–15)
HCT VFR BLD AUTO: 43.5 % (ref 35–47)
HGB BLD-MCNC: 14.3 G/DL (ref 11.7–15.7)
IMM GRANULOCYTES # BLD: 0 10E9/L (ref 0–0.4)
IMM GRANULOCYTES NFR BLD: 0.2 %
LYMPHOCYTES # BLD AUTO: 1.2 10E9/L (ref 0.8–5.3)
LYMPHOCYTES NFR BLD AUTO: 14.4 %
MCH RBC QN AUTO: 32.1 PG (ref 26.5–33)
MCHC RBC AUTO-ENTMCNC: 32.9 G/DL (ref 31.5–36.5)
MCV RBC AUTO: 98 FL (ref 78–100)
MONOCYTES # BLD AUTO: 0.3 10E9/L (ref 0–1.3)
MONOCYTES NFR BLD AUTO: 4 %
NEUTROPHILS # BLD AUTO: 6.9 10E9/L (ref 1.6–8.3)
NEUTROPHILS NFR BLD AUTO: 80.3 %
NRBC # BLD AUTO: 0 10*3/UL
NRBC BLD AUTO-RTO: 0 /100
PLATELET # BLD AUTO: 222 10E9/L (ref 150–450)
RBC # BLD AUTO: 4.46 10E12/L (ref 3.8–5.2)
WBC # BLD AUTO: 8.6 10E9/L (ref 4–11)

## 2018-05-04 PROCEDURE — 85025 COMPLETE CBC W/AUTO DIFF WBC: CPT | Performed by: INTERNAL MEDICINE

## 2018-05-04 PROCEDURE — G0463 HOSPITAL OUTPT CLINIC VISIT: HCPCS | Mod: ZF

## 2018-05-04 PROCEDURE — 36415 COLL VENOUS BLD VENIPUNCTURE: CPT | Performed by: INTERNAL MEDICINE

## 2018-05-04 RX ORDER — PREDNISONE 5 MG/1
TABLET ORAL
Qty: 120 TABLET | Refills: 11 | Status: SHIPPED | OUTPATIENT
Start: 2018-05-04 | End: 2018-05-10

## 2018-05-04 RX ORDER — MONTELUKAST SODIUM 10 MG/1
10 TABLET ORAL EVERY EVENING
Qty: 90 TABLET | Refills: 3 | Status: SHIPPED | OUTPATIENT
Start: 2018-05-04 | End: 2019-10-23

## 2018-05-04 ASSESSMENT — PAIN SCALES - GENERAL: PAINLEVEL: NO PAIN (0)

## 2018-05-04 NOTE — PATIENT INSTRUCTIONS
Get lab work and CT scan today.     Continue 10 mg prednisone daily for now, if new infiltrates, we will increase to 20 mg, if not, will keep you on 10 mg daily for 2-4 weeks, then start slow taper (1mg every 2-4 weeks) to taper you back to 6 mg daily.    Restart montelukast.     Follow up 3 months.

## 2018-05-04 NOTE — MR AVS SNAPSHOT
After Visit Summary   5/4/2018    Meenakshi Mckeon    MRN: [id number]           Patient Information     Date Of Birth          1960        Visit Information        Provider Department      5/4/2018 8:00 AM Paradise Ge MD [facility]        Today's Diagnoses     Eosinophilic pneumonia (H)    -  1    ILD (interstitial lung disease) (H)          Care Instructions    Get lab work and CT scan today.     Continue 10 mg prednisone daily for now, if new infiltrates, we will increase to 20 mg, if not, will keep you on 10 mg daily for 2-4 weeks, then start slow taper (1mg every 2-4 weeks) to taper you back to 6 mg daily.    Restart montelukast.     Follow up 3 months.           Follow-ups after your visit        Follow-up notes from your care team     Return in about 3 months (around 8/4/2018).      Your next 10 appointments already scheduled     May 04, 2018  8:45 AM CDT   Lab with  LAB   [facility] ([facility])    [address] [place] [id number] [phone]            May 04, 2018  8:00 PM CDT   CT CHEST W/O CONTRAST with UCCT1   [facility] CT ([facility])    [address] [place] [id number] [phone]           Please bring any scans or X-rays taken at other hospitals, if similar tests were done. Also bring a list of your medicines, including vitamins, minerals and over-the-counter drugs. It is safest to leave personal items at home.  Be sure to tell your doctor:   If you have any allergies.   If there s any chance you are pregnant.   If you are breastfeeding.  You do not need to do anything special to prepare for this exam.  Please wear loose clothing, such as a sweat suit or jogging clothes. Avoid snaps, zippers and other metal. We may ask you to undress and put on a hospital gown.            Aug 03, 2018  8:00 AM CDT   (Arrive by 7:45 AM)    Return Interstitial Lung with Paradise Ge MD   [facility] ([facility])    [address]  [address] [place] [address] [phone]              Future tests that were ordered for you today     Open Future Orders        Priority Expected Expires Ordered    CBC with platelets differential Routine 5/4/2018 6/3/2018 5/4/2018    CT Chest w/o contrast Routine 5/4/2018 5/4/2019 5/4/2018            Who to contact     If you have questions or need follow up information about today's clinic visit or your schedule please contact [facility] directly at [phone].  Normal or non-critical lab and imaging results will be communicated to you by [employer], letter or phone within 4 business days after the clinic has received the results. If you do not hear from us within 7 days, please contact the clinic through [employer] or phone. If you have a critical or abnormal lab result, we will notify you by phone as soon as possible.  Submit refill requests through [employer] or call your pharmacy and they will forward the refill request to us. Please allow 3 business days for your refill to be completed.          Additional Information About Your Visit        [employer] Information     [employer] gives you secure access to your electronic health record. If you see a primary care provider, you can also send messages to your care team and make appointments. If you have questions, please call your primary care clinic.  If you do not have a primary care provider, please call [phone] and they will assist you.        Care EveryWhere ID     This is your Care EveryWhere ID. This could be used by other organizations to access your [facility] medical records  [id number]        Your Vitals Were     Pulse Respirations Pulse Oximetry             83 16 98%          Blood Pressure from Last 3 Encounters:   05/04/18 144/86   01/05/18 (!) 143/92    10/06/17 144/89    Weight from Last 3 Encounters:   10/06/17 72.6 kg (160 lb)   06/30/17 73.3 kg (161 lb 9.6 oz)   03/31/17 72.2 kg (159 lb 3.2 oz)                 Today's Medication Changes          These changes are accurate as of 5/4/18  8:24 AM.  If you have any questions, ask your nurse or doctor.               These medicines have changed or have updated prescriptions.        Dose/Directions    predniSONE 5 MG tablet   Commonly known as:  DELTASONE   This may have changed:  additional instructions   Used for:  ILD (interstitial lung disease) (H)   Changed by:  Paradise Ge MD        Take as directed   Quantity:  120 tablet   Refills:  11            Where to get your medicines      These medications were sent to [facility] [id number] [place] [address], [place] - [address] [facility]  [address] [place] [address]     Phone:  [phone]     montelukast 10 MG tablet    predniSONE 5 MG tablet                Primary Care Provider Office Phone # Fax #    Virgen Graham [phone]       [facility] [address] [place] [address]        Equal Access to Services     SARAHY MORALES AH: Hadii aad ku hadasho Soomaali, waaxda luqadaha, qaybta kaalmada adeegyada, waxay idiin haylilin yadiel fernandez. So [facility] [phone].    ATENCIÓN: Si habla español, tiene a rodriguez disposición servicios gratuitos de asistencia lingüística. Llomar al [phone].    We comply with applicable federal civil rights laws and [place] laws. We do not discriminate on the basis of race, color, national origin, age, disability, sex, sexual orientation, or gender identity.            Thank you!     Thank you for choosing [facility]  for your care. Our goal is always to provide you with excellent care. Hearing back from our patients is one way we can continue to improve our services. Please take a few minutes to complete the written survey  that you may receive in the mail after your visit with us. Thank you!             Your Updated Medication List - Protect others around you: Learn how to safely use, store and throw away your medicines at www.disposemymeds.org.          This list is accurate as of 5/4/18  8:24 AM.  Always use your most recent med list.                   Brand Name Dispense Instructions for use Diagnosis    albuterol 108 (90 Base) MCG/ACT Inhaler    PROAIR HFA    1 Inhaler    Inhale 2 puffs into the lungs every 6 hours    Reactive airway disease with wheezing       AMBIEN PO      Take 10 mg by mouth nightly as needed for sleep        [employer] GLUCOS-CHONDROIT TRIPLE ST PO           IBUPROFEN PO      Take 400 mg by mouth        montelukast 10 MG tablet    SINGULAIR    90 tablet    Take 1 tablet (10 mg) by mouth every evening    ILD (interstitial lung disease) (H)       OMEGA-3 FISH OIL PO      Take by mouth daily        predniSONE 5 MG tablet    DELTASONE    120 tablet    Take as directed    ILD (interstitial lung disease) (H)       PROBIOTIC PO       ILD (interstitial lung disease) (H)       VITAMIN D (CHOLECALCIFEROL) PO      Take by mouth daily        ZANTAC PO

## 2018-05-04 NOTE — LETTER
5/4/2018      RE: Meenakshi Mckeon  [address] [address]       [facility]    Reason for Visit  Meenakshi Mckeon is a 58 year old year old female who is being seen for Interstitial Lung Disease (ILD) (Patient is being seen for follow up of ILD)  HPI    Ms. Mckeon is a 58 year old woman with a history of chronic eosinophilic pneumonia, breast cancer, GERD, and atrial fibrillation who presents for follow up. She was last seen in January. At that visit, she was doing well, without a flare since March 2017. She has been on a slow prednisone taper, nd in January was on 6 mg daily. Later in January, she developed a URI, and prednisone was increased to 20 mg daily for 1 week. Since then, she tapered back down to 6 mg daily, but started to feel as if her symptoms were increasing slightly (more fatigue later in the day and upper airway crackling), so prednisone was increased back to 10 mg daily.     Today, she has some concern that she could be developing more of a flare. She has noted some of her typical symptom that develop prior to flares, including eye itching/aching, as well as some tingling in the face and forgetfulness, all of which occur typically prior to flares. Her breathing currently feels normal, and she denies shortness of breath, cough, or wheezing. She is not exercising much right now, however. She denies chest pain, but does have some long standing tissue damage on the left lateral chest wall from radiation from breast cancer, which is a bit more agitated than typical recently. No fevers, chills. Weight is stable. Occasional night sweats, long standing. No new rashes or skin changes. She does get some rhinorrhea in the spring from Allergies, otherwise no nasal symptoms.     She self discontinued Montelukast in February or so. She remains on 10 mg of prednisone daily. She is not needing to use her Albuterol.     Current Outpatient Prescriptions    Medication     albuterol (PROAIR HFA) 108 (90 BASE) MCG/ACT Inhaler     Glucos-Chondroit-Boron-C-Mn ([employer] GLUCOS-CHONDROIT TRIPLE ST PO)     IBUPROFEN PO     montelukast (SINGULAIR) 10 MG tablet     Omega-3 Fatty Acids (OMEGA-3 FISH OIL PO)     predniSONE (DELTASONE) 5 MG tablet     Probiotic Product (PROBIOTIC PO)     RaNITidine HCl (ZANTAC PO)     VITAMIN D, CHOLECALCIFEROL, PO     Zolpidem Tartrate (AMBIEN PO)     [DISCONTINUED] montelukast (SINGULAIR) 10 MG tablet     No current facility-administered medications for this visit.      Allergies   Allergen Reactions     Kenalog [Triamcinolone] Swelling     Past Medical History:   Diagnosis Date     Atrial fibrillation (H) 10/2014     Cancer (H)     Breast DCIS dx 2013. Lumpectomy and radiation completed 1/2014     Gastro-oesophageal reflux disease      ILD (interstitial lung disease) (H)     chronic eosinophilic pneumonia associated with radiation therapy, CT-guided needle bx 2014 of LLL consolidation showed organizing eosinophlic pneumonia (reviewed by Soha at [facility]). Started prednisone 4/2014.  BAL 6/2014 showed no eos (?if she was on pred at the timeL; several flares as pred has been tapered     Lung nodule     7 mm RLL nodule first noted 2016     Right heart enlargement 2/8/2017       Past Surgical History:   Procedure Laterality Date     cubocalneal fusion       KNEE SURGERY       LUMPECTOMY BREAST Left 2013       Social History     Social History     Marital status:      Spouse name: N/A     Number of children: N/A     Years of education: N/A     Occupational History     Not on file.     Social History Main Topics     Smoking status: Former Smoker     Packs/day: 0.50     Years: 20.00     Types: Cigarettes     Quit date: 7/7/2000     Smokeless tobacco: Never Used     Alcohol use 0.0 oz/week     0 Standard drinks or equivalent per week     Drug use: No     Sexual activity: Not on file     Other Topics Concern     Not on file     Social  History Narrative    No exposure to asbestos, hot tubs, birds, mold, silica.  Hobbies: gardening, biking.  Nurse at [employer].  .       Family History   Problem Relation Age of Onset     CEREBROVASCULAR DISEASE Mother      Hypertension Mother      Chronic Obstructive Pulmonary Disease Mother      C.A.D. Maternal Grandfather      both grfa with MI         ROS Pulmonary  A complete ROS was otherwise negative except as noted in the HPI.    Vitals: /86 (BP Location: Right arm, Patient Position: Chair, Cuff Size: Adult Regular)  Pulse 83  Resp 16  SpO2 98%    Exam:   GENERAL APPEARANCE: Well developed, well nourished, alert, and in no apparent distress.  HEENT: MMM, TMs normal. Nasal mucous normal.   LYMPHATICS: No significant cervical, or supraclavicular nodes.  RESP: good air flow throughout.  No crackles. No rhonchi. No wheezes.  CV: Normal S1, S2, regular rhythm, normal rate. No murmur.  No LE edema.   ABD: BS+, soft, nontender.  MS: extremities normal. No clubbing. No cyanosis.  SKIN: no rash on limited exam.  NEURO: Mentation intact, speech normal, normal gait and stance.  PSYCH: mentation appears normal. and affect normal/bright.    Results:  No results found for this or any previous visit (from the past 168 hour(s)).    No new testing prior to today's visit.     I reviewed results with the patient.      Assessment and plan:   Ms. Mckeon is a 58 year old woman with a history of chronic eosinophilic pneumonia, breast cancer, GERD, and atrial fibrillation who presents for follow up of eosinophilic pneumonia.     Unfortunately, today she feels her eosinophilic pneumonia may be starting to flare. Her respiratory symptoms are stable and in control, but she is starting to have eye symptoms and some tingling, which typically precede exacerbations for her. She also stopped her montelukast without notifying us, which may have lead to this possible flare, despite 10 mg of prednisone currently. We  will assess for flare today.     -Check CT chest, non-contrast, high resolution, inspiratory and expiratory cuts, today. If new lesions, we will increase prednisone to 20 mg daily for 1 month, then taper. If no new lesions, will continue 10 mg daily of prednisone. We will call with results later today  -Check CBC with diff today to look for eosinophilic pneumonia  -Re-start montelukast 10 mg daily   -Continue prednisone 10 mg daily for now, if new flare, will increase to 20 mg daily, if no flare, continue 10 mg daily for 2 weeks, then slow taper by 1 mg every 2-4 weeks to goal of 6 mg daily   -Okay to get NEW inactivated shingles shot  -Follow up in 3 months.     Seen and discussed with Dr. Luma Mary MD  Pulmonary and Critical Care Fellow  Pager: [phone]       I saw and evaluated patient with Fellow.  Case discussed - agree with note.  We ordered a chest CT today that I reviewed: no new consolidations.  I reviewed labs: no eosinophilia.  Will slowly taper prednisone as above by 1 mg every month.  Restart montelukast.  Her insurance refused coverage of mepolizumab in the past.    PARADISE CORBETT M.D.            Paradise Corbett MD

## 2018-05-04 NOTE — PROGRESS NOTES
Contacted patient regarding results of chest CT scan.  Per Dr. Ge:    Please let her know that chest CT shows no new changes by my review.  Plan is to restart montelukast.  Continue prednisone 10 mg daily for 2 weeks, then decrease to 9 mg daily if she feels better.  Decrease by 1 mg every month until she gets to 5 mg daily.  Thanks.  HK              Patient notified of plan and is in agreement.

## 2018-05-04 NOTE — PROGRESS NOTES
[facility]    Reason for Visit  Meenakshi Mckeon is a 58 year old year old female who is being seen for Interstitial Lung Disease (ILD) (Patient is being seen for follow up of ILD)  HPI    Ms. Mckeon is a 58 year old woman with a history of chronic eosinophilic pneumonia, breast cancer, GERD, and atrial fibrillation who presents for follow up. She was last seen in January. At that visit, she was doing well, without a flare since March 2017. She has been on a slow prednisone taper, nd in January was on 6 mg daily. Later in January, she developed a URI, and prednisone was increased to 20 mg daily for 1 week. Since then, she tapered back down to 6 mg daily, but started to feel as if her symptoms were increasing slightly (more fatigue later in the day and upper airway crackling), so prednisone was increased back to 10 mg daily.     Today, she has some concern that she could be developing more of a flare. She has noted some of her typical symptom that develop prior to flares, including eye itching/aching, as well as some tingling in the face and forgetfulness, all of which occur typically prior to flares. Her breathing currently feels normal, and she denies shortness of breath, cough, or wheezing. She is not exercising much right now, however. She denies chest pain, but does have some long standing tissue damage on the left lateral chest wall from radiation from breast cancer, which is a bit more agitated than typical recently. No fevers, chills. Weight is stable. Occasional night sweats, long standing. No new rashes or skin changes. She does get some rhinorrhea in the spring from Allergies, otherwise no nasal symptoms.     She self discontinued Montelukast in February or so. She remains on 10 mg of prednisone daily. She is not needing to use her Albuterol.     Current Outpatient Prescriptions   Medication     albuterol (PROAIR HFA) 108 (90 BASE) MCG/ACT Inhaler      Glucos-Chondroit-Boron-C-Mn ([employer]S GLUCOS-CHONDROIT TRIPLE ST PO)     IBUPROFEN PO     montelukast (SINGULAIR) 10 MG tablet     Omega-3 Fatty Acids (OMEGA-3 FISH OIL PO)     predniSONE (DELTASONE) 5 MG tablet     Probiotic Product (PROBIOTIC PO)     RaNITidine HCl (ZANTAC PO)     VITAMIN D, CHOLECALCIFEROL, PO     Zolpidem Tartrate (AMBIEN PO)     [DISCONTINUED] montelukast (SINGULAIR) 10 MG tablet     No current facility-administered medications for this visit.      Allergies   Allergen Reactions     Kenalog [Triamcinolone] Swelling     Past Medical History:   Diagnosis Date     Atrial fibrillation (H) 10/2014     Cancer (H)     Breast DCIS dx 2013. Lumpectomy and radiation completed 1/2014     Gastro-oesophageal reflux disease      ILD (interstitial lung disease) (H)     chronic eosinophilic pneumonia associated with radiation therapy, CT-guided needle bx 2014 of LLL consolidation showed organizing eosinophlic pneumonia (reviewed by Soha at [facility]). Started prednisone 4/2014.  BAL 6/2014 showed no eos (?if she was on pred at the timeL; several flares as pred has been tapered     Lung nodule     7 mm RLL nodule first noted 2016     Right heart enlargement 2/8/2017       Past Surgical History:   Procedure Laterality Date     cubocalneal fusion       KNEE SURGERY       LUMPECTOMY BREAST Left 2013       Social History     Social History     Marital status:      Spouse name: N/A     Number of children: N/A     Years of education: N/A     Occupational History     Not on file.     Social History Main Topics     Smoking status: Former Smoker     Packs/day: 0.50     Years: 20.00     Types: Cigarettes     Quit date: 7/7/2000     Smokeless tobacco: Never Used     Alcohol use 0.0 oz/week     0 Standard drinks or equivalent per week     Drug use: No     Sexual activity: Not on file     Other Topics Concern     Not on file     Social History Narrative    No exposure to asbestos, hot tubs, birds, mold,  silica.  Hobbies: gardening, biking.  Nurse at [employer].  .       Family History   Problem Relation Age of Onset     CEREBROVASCULAR DISEASE Mother      Hypertension Mother      Chronic Obstructive Pulmonary Disease Mother      C.A.D. Maternal Grandfather      both grfa with MI         ROS Pulmonary  A complete ROS was otherwise negative except as noted in the HPI.    Vitals: /86 (BP Location: Right arm, Patient Position: Chair, Cuff Size: Adult Regular)  Pulse 83  Resp 16  SpO2 98%    Exam:   GENERAL APPEARANCE: Well developed, well nourished, alert, and in no apparent distress.  HEENT: MMM, TMs normal. Nasal mucous normal.   LYMPHATICS: No significant cervical, or supraclavicular nodes.  RESP: good air flow throughout.  No crackles. No rhonchi. No wheezes.  CV: Normal S1, S2, regular rhythm, normal rate. No murmur.  No LE edema.   ABD: BS+, soft, nontender.  MS: extremities normal. No clubbing. No cyanosis.  SKIN: no rash on limited exam.  NEURO: Mentation intact, speech normal, normal gait and stance.  PSYCH: mentation appears normal. and affect normal/bright.    Results:  No results found for this or any previous visit (from the past 168 hour(s)).    No new testing prior to today's visit.     I reviewed results with the patient.      Assessment and plan:   Ms. Mckeon is a 58 year old woman with a history of chronic eosinophilic pneumonia, breast cancer, GERD, and atrial fibrillation who presents for follow up of eosinophilic pneumonia.     Unfortunately, today she feels her eosinophilic pneumonia may be starting to flare. Her respiratory symptoms are stable and in control, but she is starting to have eye symptoms and some tingling, which typically precede exacerbations for her. She also stopped her montelukast without notifying us, which may have lead to this possible flare, despite 10 mg of prednisone currently. We will assess for flare today.     -Check CT chest, non-contrast, high  resolution, inspiratory and expiratory cuts, today. If new lesions, we will increase prednisone to 20 mg daily for 1 month, then taper. If no new lesions, will continue 10 mg daily of prednisone. We will call with results later today  -Check CBC with diff today to look for eosinophilic pneumonia  -Re-start montelukast 10 mg daily   -Continue prednisone 10 mg daily for now, if new flare, will increase to 20 mg daily, if no flare, continue 10 mg daily for 2 weeks, then slow taper by 1 mg every 2-4 weeks to goal of 6 mg daily   -Okay to get NEW inactivated shingles shot  -Follow up in 3 months.     Seen and discussed with Dr. Luma Mary MD  Pulmonary and Critical Care Fellow  Pager: [phone]       I saw and evaluated patient with Fellow.  Case discussed - agree with note.  We ordered a chest CT today that I reviewed: no new consolidations.  I reviewed labs: no eosinophilia.  Will slowly taper prednisone as above by 1 mg every month.  Restart montelukast.  Her insurance refused coverage of mepolizumab in the past.    DOMO CORBETT M.D.

## 2018-07-18 ENCOUNTER — TELEPHONE (OUTPATIENT)
Dept: PULMONOLOGY | Facility: CLINIC | Age: 58
End: 2018-07-18

## 2018-07-18 NOTE — TELEPHONE ENCOUNTER
[facility] Call Center    Phone Message    May a detailed message be left on voicemail: yes    Reason for Call: Other: pt was given a prescription for Macrobid 100mg to treat a UTI. Her  who is a physician said that is not good for her ILD.. She left a message for you on the  [phone] line. Please call her back ASAP to discuss this issue     Action Taken: Message routed to:  [facility] ([facility] Pulmonary

## 2018-07-19 NOTE — TELEPHONE ENCOUNTER
Discussed with Dr Ge who stated there is known pulmonary toxicity with medication and would recommend she not take and ask prescriber to consider treating with different antibiotic. Informed patient. Agreed with plan.

## 2018-08-02 ASSESSMENT — ENCOUNTER SYMPTOMS
MUSCLE WEAKNESS: 0
FLANK PAIN: 0
JOINT SWELLING: 1
NECK PAIN: 0
ARTHRALGIAS: 1
MYALGIAS: 0
DYSURIA: 1
STIFFNESS: 1
BACK PAIN: 0
MUSCLE CRAMPS: 0
DIFFICULTY URINATING: 0
HEMATURIA: 1

## 2018-08-03 ENCOUNTER — OFFICE VISIT (OUTPATIENT)
Dept: PULMONOLOGY | Facility: CLINIC | Age: 58
End: 2018-08-03
Attending: INTERNAL MEDICINE
Payer: COMMERCIAL

## 2018-08-03 VITALS
HEART RATE: 82 BPM | BODY MASS INDEX: 27.31 KG/M2 | SYSTOLIC BLOOD PRESSURE: 125 MMHG | DIASTOLIC BLOOD PRESSURE: 80 MMHG | RESPIRATION RATE: 17 BRPM | WEIGHT: 160 LBS | HEIGHT: 64 IN | OXYGEN SATURATION: 100 %

## 2018-08-03 DIAGNOSIS — J84.9 ILD (INTERSTITIAL LUNG DISEASE) (H): Primary | ICD-10-CM

## 2018-08-03 PROCEDURE — G0463 HOSPITAL OUTPT CLINIC VISIT: HCPCS | Mod: ZF

## 2018-08-03 ASSESSMENT — PAIN SCALES - GENERAL: PAINLEVEL: NO PAIN (0)

## 2018-08-03 NOTE — MR AVS SNAPSHOT
After Visit Summary   8/3/2018    Meenakshi Mckeon    MRN: [id number]           Patient Information     Date Of Birth          1960        Visit Information        Provider Department      8/3/2018 8:00 AM Paradise Ge MD [facility]        Today's Diagnoses     ILD (interstitial lung disease) (H)    -  1       Follow-ups after your visit        Follow-up notes from your care team     Return in about 4 months (around 12/3/2018).      Your next 10 appointments already scheduled     Dec 07, 2018  8:00 AM CST   (Arrive by 7:45 AM)   Return Interstitial Lung with Paradise eG MD   [facility] ([facility] [place])    [address]  [address] [place] [id number] [phone]              Who to contact     If you have questions or need follow up information about today's clinic visit or your schedule please contact [facility] directly at [phone].  Normal or non-critical lab and imaging results will be communicated to you by Carlipa Systemshart, letter or phone within 4 business days after the clinic has received the results. If you do not hear from us within 7 days, please contact the clinic through Total Communicator Solutionst or phone. If you have a critical or abnormal lab result, we will notify you by phone as soon as possible.  Submit refill requests through Metafor Software or call your pharmacy and they will forward the refill request to us. Please allow 3 business days for your refill to be completed.          Additional Information About Your Visit        Carlipa Systemshart Information     Meta[employer] Software gives you secure access to your electronic health record. If you see a primary care provider, you can also send messages to your care team and make appointments. If you have questions, please call your primary care clinic.  If you do not have a primary care provider, please call [phone] and they will assist you.    "     Care EveryWhere ID     This is your Care EveryWhere ID. This could be used by other organizations to access your [place] medical records  [id number]        Your Vitals Were     Pulse Respirations Height Pulse Oximetry BMI (Body Mass Index)       82 17 1.626 m (5' 4\") 100% 27.46 kg/m2        Blood Pressure from Last 3 Encounters:   08/03/18 125/80   05/04/18 144/86   01/05/18 (!) 143/92    Weight from Last 3 Encounters:   08/03/18 72.6 kg (160 lb)   10/06/17 72.6 kg (160 lb)   06/30/17 73.3 kg (161 lb 9.6 oz)              Today, you had the following     No orders found for display         Today's Medication Changes          These changes are accurate as of 8/3/18  8:22 AM.  If you have any questions, ask your nurse or doctor.               These medicines have changed or have updated prescriptions.        Dose/Directions    predniSONE 1 MG tablet   Commonly known as:  DELTASONE   This may have changed:    - how much to take  - additional instructions  - Another medication with the same name was removed. Continue taking this medication, and follow the directions you see here.   Used for:  ILD (interstitial lung disease) (H)        Take 9 mg daily for 1 month, then taper by 1 mg every month.   Quantity:  120 tablet   Refills:  3                Primary Care Provider Office Phone # Fax #    Virgen Graham [phone]       [facility] [address] [place] [address]        Equal Access to Services     SARAHY MORALES AH: Hadii aad ku hadasho Sorandee, waaxda luqadaha, qaybta kaalmada amandaegyacatalina, marcus fernandez. So [facility] [phone].    ATENCIÓN: Si habla español, tiene a rodriguez disposición servicios gratuitos de asistencia lingüística. Llame al [phone].    We comply with applicable federal civil rights laws and [place] laws. We do not discriminate on the basis of race, color, national origin, age, disability, sex, sexual orientation, or gender " identity.            Thank you!     Thank you for choosing [facility]  for your care. Our goal is always to provide you with excellent care. Hearing back from our patients is one way we can continue to improve our services. Please take a few minutes to complete the written survey that you may receive in the mail after your visit with us. Thank you!             Your Updated Medication List - Protect others around you: Learn how to safely use, store and throw away your medicines at www.disposemymeds.org.          This list is accurate as of 8/3/18  8:22 AM.  Always use your most recent med list.                   Brand Name Dispense Instructions for use Diagnosis    albuterol 108 (90 Base) MCG/ACT Inhaler    PROAIR HFA    1 Inhaler    Inhale 2 puffs into the lungs every 6 hours    Reactive airway disease with wheezing       AMBIEN PO      Take 10 mg by mouth nightly as needed for sleep        [employer] GLUCOS-CHONDROIT TRIPLE ST PO           IBUPROFEN PO      Take 400 mg by mouth        montelukast 10 MG tablet    SINGULAIR    90 tablet    Take 1 tablet (10 mg) by mouth every evening    ILD (interstitial lung disease) (H)       OMEGA-3 FISH OIL PO      Take by mouth daily        predniSONE 1 MG tablet    DELTASONE    120 tablet    Take 9 mg daily for 1 month, then taper by 1 mg every month.    ILD (interstitial lung disease) (H)       PROBIOTIC PO       ILD (interstitial lung disease) (H)       VITAMIN D (CHOLECALCIFEROL) PO      Take by mouth daily        ZANTAC PO

## 2018-08-03 NOTE — PROGRESS NOTES
[facility] Interstitial Lung Disease Clinic    Reason for Visit  Meenakshi Mckeon is a 58 year old year old female who is being seen for RECHECK (CHR EOS PNA)    HPI    Ms. Mckeon is a 58-year-old who is here for follow-up of chronic eosinophilic pneumonia.  She reports that she is doing well.  Her prednisone doses has tapered down to 5 mg per day and she takes montelukast daily.  She denies any symptoms of flares.  She denies fevers, rash, wheezing, or cough.  She is able to swim, bike, and do yoga.  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  In the past, she has had recurrent flares when she had been weaned off prednisone at an outside clinic.        Current Outpatient Prescriptions   Medication     albuterol (PROAIR HFA) 108 (90 BASE) MCG/ACT Inhaler     Glucos-Chondroit-Boron-C-Mn (CVS GLUCOS-CHONDROIT TRIPLE ST PO)     IBUPROFEN PO     montelukast (SINGULAIR) 10 MG tablet     Omega-3 Fatty Acids (OMEGA-3 FISH OIL PO)     predniSONE (DELTASONE) 1 MG tablet     Probiotic Product (PROBIOTIC PO)     RaNITidine HCl (ZANTAC PO)     VITAMIN D, CHOLECALCIFEROL, PO     Zolpidem Tartrate (AMBIEN PO)     No current facility-administered medications for this visit.      Allergies   Allergen Reactions     Kenalog [Triamcinolone] Swelling     Past Medical History:   Diagnosis Date     Atrial fibrillation (H) 10/2014     Cancer (H)     Breast DCIS dx 2013. Lumpectomy and radiation completed 1/2014     Gastro-oesophageal reflux disease      ILD (interstitial lung disease) (H)     chronic eosinophilic pneumonia associated with radiation therapy, CT-guided needle bx 2014 of LLL consolidation showed organizing eosinophlic pneumonia (reviewed by Soha at [facility]). Started prednisone 4/2014.  BAL 6/2014 showed no eos (?if she was on pred at the timeL; several flares as pred has been tapered     Lung nodule     7 mm RLL nodule first noted 2016     Right heart enlargement 2/8/2017       Past  "Surgical History:   Procedure Laterality Date     cubocalneal fusion       KNEE SURGERY       LUMPECTOMY BREAST Left 2013       Social History     Social History     Marital status:      Spouse name: N/A     Number of children: N/A     Years of education: N/A     Occupational History     Not on file.     Social History Main Topics     Smoking status: Former Smoker     Packs/day: 0.50     Years: 20.00     Types: Cigarettes     Quit date: 7/7/2000     Smokeless tobacco: Never Used     Alcohol use 0.0 oz/week     0 Standard drinks or equivalent per week     Drug use: No     Sexual activity: Not on file     Other Topics Concern     Not on file     Social History Narrative    No exposure to asbestos, hot tubs, birds, mold, silica.  Hobbies: gardening, biking.  Nurse at [employer].  .       Family History   Problem Relation Age of Onset     Cerebrovascular Disease Mother      Hypertension Mother      Chronic Obstructive Pulmonary Disease Mother      C.A.D. Maternal Grandfather      both grfa with MI             ROS Pulmonary  A complete ROS was otherwise negative except as noted in the HPI.    Vitals: /80  Pulse 82  Resp 17  Ht 1.626 m (5' 4\")  Wt 72.6 kg (160 lb)  SpO2 100%  BMI 27.46 kg/m2    Exam:   GENERAL APPEARANCE: Well developed, well nourished, alert, and in no apparent distress.  RESP: good air flow throughout.  No crackles. No rhonchi. No wheezes.  CV: Normal S1, S2, regular rhythm, normal rate. No murmur.  No LE edema.   MS: extremities normal. No clubbing. No cyanosis.  SKIN: no rash on limited exam.  NEURO: Mentation intact, speech normal, normal gait and stance.  PSYCH: mentation appears normal. and affect normal/bright.      Assessment and plan:   Ms. Mckeon is a 58-year-old who is here for follow-up of chronic eosinophilic pneumonia.  1.  Interstitial lung disease.  Ms. Conrad has chronic eosinophilic pneumonia felt to be associated with radiation therapy for her breast " cancer.  There are case reports of this that have been published.  She has done well for the past year or more as we have slowly tapered her prednisone.  I did add montelukast as a steroid sparing agent which has been described for chronic eosinophilic pneumonia.  Her prednisone dose has been tapered down to 5 mg per day.  When her prednisone has been tapered to a lower dose in the past, she has flared.  We will continue the current regimen of daily montelukast and prednisone 5 mg per day.  I have encouraged her to stay active and exercise as she is doing.  She will need to get the flu vaccine this fall when it becomes available.  We have tried to use mepolizumab for her chronic eosinophilic pneumonia in the past, but her insurance denied coverage.  There is a scientific rationale for using this for chronic eosinophilic pneumonia.  If she worsens in the future, then we would need to increase her prednisone and try again to get coverage for mepolizumab.  I will see her back in 4 months with for follow-up.  If she develops develops symptoms of a flare prior to that, she will call.

## 2018-10-09 ENCOUNTER — PATIENT OUTREACH (OUTPATIENT)
Dept: PULMONOLOGY | Facility: CLINIC | Age: 58
End: 2018-10-09

## 2018-10-09 NOTE — PROGRESS NOTES
[facility]:  Care Coordination Note     SITUATION   Meenakshi Mckeon is a 58 year old female who is receiving support for:  Clinic Care Coordination - Follow-up (Question about upcoming hip replacement)  .    BACKGROUND     Needs to have a total hip replacement soon and is wondering if she needs to do anything from a lung stand point or with her medications.    PLAN     Nursing Action/Patient Instruction: Notified Dr Ge. Dr Ge would like her to increase her Prednisone to 20 mg the day of surgery and for 2 days after surgery and then back to baseline 5 mg. Informed patient.     Patient Response/Questions/Concerns: Dr Lurdes Oropeza from [facility] will be doing the surgery on November 7 th. Asked that we notify them and her PCP Dr Virgen William who will be doing her pre-op.           Odilia Conroy RN  ILD Care Coordinator  [phone]

## 2018-11-30 ASSESSMENT — ENCOUNTER SYMPTOMS
DYSURIA: 1
BACK PAIN: 0
NECK PAIN: 0
ARTHRALGIAS: 0
FLANK PAIN: 0
MUSCLE CRAMPS: 0
STIFFNESS: 1
JOINT SWELLING: 0
MUSCLE WEAKNESS: 0
HEMATURIA: 0
DIFFICULTY URINATING: 0
MYALGIAS: 0

## 2018-12-07 ENCOUNTER — OFFICE VISIT (OUTPATIENT)
Dept: PULMONOLOGY | Facility: CLINIC | Age: 58
End: 2018-12-07
Attending: INTERNAL MEDICINE
Payer: COMMERCIAL

## 2018-12-07 VITALS
DIASTOLIC BLOOD PRESSURE: 78 MMHG | WEIGHT: 160 LBS | BODY MASS INDEX: 27.31 KG/M2 | HEART RATE: 92 BPM | SYSTOLIC BLOOD PRESSURE: 113 MMHG | HEIGHT: 64 IN | OXYGEN SATURATION: 99 % | RESPIRATION RATE: 16 BRPM

## 2018-12-07 DIAGNOSIS — J84.9 ILD (INTERSTITIAL LUNG DISEASE) (H): Primary | ICD-10-CM

## 2018-12-07 PROCEDURE — G0463 HOSPITAL OUTPT CLINIC VISIT: HCPCS | Mod: ZF

## 2018-12-07 ASSESSMENT — PAIN SCALES - GENERAL: PAINLEVEL: NO PAIN (0)

## 2018-12-07 NOTE — NURSING NOTE
Chief Complaint   Patient presents with     Interstitial Lung Disease (ILD)     4 month follow up      Mercedes Dumont CMA

## 2018-12-07 NOTE — PROGRESS NOTES
[facility] Interstitial Lung Disease Clinic    Reason for Visit  Meenakshi Mckeon is a 58 year old year old female who is being seen for Interstitial Lung Disease (ILD) (4 month follow up )    HPI    Ms. Granados is a 58-year-old with chronic eosinophilic pneumonia who is here for follow-up.  Her chronic eosinophilic pneumonia is felt to be associated with radiation therapy for her breast cancer.  There are case reports of this that have been published.  She has been treated with low-dose prednisone and montelukast, and I added the montelukast as a steroid sparing agent which has been described for chronic eosinophilic pneumonia.  She has done well with this combination of low-dose prednisone and montelukast without recurrent flares.  She was having recurrent flares when she first came to see me in clinic.    She had hip replacement on November 7 of her left hip.  She reports that the surgery went well.  We increased her prednisone to 20 mg 1 day prior and 2 days after surgery and then back to her baseline of 5 mg daily.  She did well with this.  She denies any symptoms of flare of her chronic eosinophilic pneumonia.  In the past, her symptoms that predicted flare included eye pain and systemic symptoms.  She denies shortness of breath, cough, fevers, or new skin rashes.  She is currently doing physical therapy for her hip and walks with a cane outside the home.  She has not yet received approval to restart exercise.  She did receive the flu vaccine.        Current Outpatient Prescriptions   Medication     Glucos-Chondroit-Boron-C-Mn (CVS GLUCOS-CHONDROIT TRIPLE ST PO)     IBUPROFEN PO     montelukast (SINGULAIR) 10 MG tablet     Omega-3 Fatty Acids (OMEGA-3 FISH OIL PO)     predniSONE (DELTASONE) 1 MG tablet     Probiotic Product (PROBIOTIC PO)     RaNITidine HCl (ZANTAC PO)     VITAMIN D, CHOLECALCIFEROL, PO     Zolpidem Tartrate (AMBIEN PO)     albuterol (PROAIR HFA) 108 (90 BASE) MCG/ACT Inhaler      No current facility-administered medications for this visit.      Allergies   Allergen Reactions     Kenalog [Triamcinolone] Swelling     Past Medical History:   Diagnosis Date     Atrial fibrillation (H) 10/2014     Cancer (H)     Breast DCIS dx 2013. Lumpectomy and radiation completed 1/2014     Gastro-oesophageal reflux disease      ILD (interstitial lung disease) (H)     chronic eosinophilic pneumonia associated with radiation therapy, CT-guided needle bx 2014 of LLL consolidation showed organizing eosinophlic pneumonia (reviewed by Soha at [facility]). Started prednisone 4/2014.  BAL 6/2014 showed no eos (?if she was on pred at the timeL; several flares as pred has been tapered     Lung nodule     7 mm RLL nodule first noted 2016     Right heart enlargement 2/8/2017       Past Surgical History:   Procedure Laterality Date     cubocalneal fusion       KNEE SURGERY       LUMPECTOMY BREAST Left 2013       Social History     Social History     Marital status:      Spouse name: N/A     Number of children: N/A     Years of education: N/A     Occupational History     Not on file.     Social History Main Topics     Smoking status: Former Smoker     Packs/day: 0.50     Years: 20.00     Types: Cigarettes     Quit date: 7/7/2000     Smokeless tobacco: Never Used     Alcohol use 0.0 oz/week     0 Standard drinks or equivalent per week     Drug use: No     Sexual activity: Not on file     Other Topics Concern     Not on file     Social History Narrative    No exposure to asbestos, hot tubs, birds, mold, silica.  Hobbies: gardening, biking.  Nurse at [employer].  .       Family History   Problem Relation Age of Onset     Cerebrovascular Disease Mother      Hypertension Mother      Chronic Obstructive Pulmonary Disease Mother      C.A.D. Maternal Grandfather      both grfa with MI             ROS Pulmonary  A complete ROS was otherwise negative except as noted in the HPI.    Vitals: BP  "113/78 (BP Location: Right arm, Patient Position: Chair, Cuff Size: Adult Regular)  Pulse 92  Resp 16  Ht 1.626 m (5' 4.02\")  Wt 72.6 kg (160 lb)  SpO2 99%  BMI 27.45 kg/m2    Exam:   GENERAL APPEARANCE: Well developed, well nourished, alert, and in no apparent distress.  RESP: good air flow throughout.  No crackles. No rhonchi. No wheezes.  CV: Normal S1, S2, regular rhythm, normal rate. No murmur.  No LE edema.   MS: extremities normal. No clubbing. No cyanosis.  SKIN: no rash on limited exam.  NEURO: Mentation intact, speech normal, normal gait and stance.  PSYCH: mentation appears normal. and affect normal/bright.      Assessment and plan:   Ms. Granados is a 58-year-old with chronic eosinophilic pneumonia who is here for follow-up.  As stated above, her chronic eosinophilic pneumonia is felt to be associated with radiation therapy for her breast cancer.  She was having recurrent flares every time the prednisone was weaned off when she first came to see me in in clinic.  She has done well now with low-dose prednisone and montelukast combination.  The montelukast has been described as a steroid sparing agent agent in a few case reports for chronic eosinophilic pneumonia.  We will continue her current regimen of 5 mg daily prednisone and montelukast daily.  I have asked her to start exercising when she receivea the okay from her orthopedic surgeon.  She also should get annual flu vaccines as she is doing.  I will see her back in 6 months for follow-up.  She will call us if she develops symptoms of a flare before then.  In the past, we have tried to treat her with mepolizumab.  There is a scientific rationale for this as mepolizumab targets eosinophils.  However, her insurance would not approve payment for this.  This would be an option in the future if she has recurrent flares again.            "

## 2018-12-07 NOTE — MR AVS SNAPSHOT
After Visit Summary   12/7/2018    Meenakshi Mckeon    MRN: [id number]           Patient Information     Date Of Birth          1960        Visit Information        Provider Department      12/7/2018 8:00 AM Paradise Ge MD [facility]         Follow-ups after your visit        Follow-up notes from your care team     Return in about 6 months (around 6/7/2019).      Your next 10 appointments already scheduled     Jun 14, 2019  8:00 AM CDT   (Arrive by 7:45 AM)   Return Interstitial Lung with Paradise Ge MD   [facility] ([facility] [place])    [address] [facility] [address] [address]  [address] [place] [id number] [phone]              Who to contact     If you have questions or need follow up information about today's clinic visit or your schedule please contact [facility] directly at [phone].  Normal or non-critical lab and imaging results will be communicated to you by Sungy Mobilehart, letter or phone within 4 business days after the clinic has received the results. If you do not hear from us within 7 days, please contact the clinic through Neoprospectat or phone. If you have a critical or abnormal lab result, we will notify you by phone as soon as possible.  Submit refill requests through BlackStratus or call your pharmacy and they will forward the refill request to us. Please allow 3 business days for your refill to be completed.          Additional Information About Your Visit        MyChart Information     BlackStratus gives you secure access to your electronic health record. If you see a primary care provider, you can also send messages to your care team and make appointments. If you have questions, please call your primary care clinic.  If you do not have a primary care provider, please call [phone] and they will assist you.        Care EveryWhere ID     This is your Care EveryWhere ID. This could  "be used by other organizations to access your [place] medical records  [id number]        Your Vitals Were     Pulse Respirations Height Pulse Oximetry BMI (Body Mass Index)       92 16 1.626 m (5' 4.02\") 99% 27.45 kg/m2        Blood Pressure from Last 3 Encounters:   12/07/18 113/78   08/03/18 125/80   05/04/18 144/86    Weight from Last 3 Encounters:   12/07/18 72.6 kg (160 lb)   08/03/18 72.6 kg (160 lb)   10/06/17 72.6 kg (160 lb)              Today, you had the following     No orders found for display         Today's Medication Changes          These changes are accurate as of 12/7/18  8:34 AM.  If you have any questions, ask your nurse or doctor.               These medicines have changed or have updated prescriptions.        Dose/Directions    predniSONE 1 MG tablet   Commonly known as:  DELTASONE   This may have changed:    - how much to take  - additional instructions   Used for:  ILD (interstitial lung disease) (H)        Take 9 mg daily for 1 month, then taper by 1 mg every month.   Quantity:  120 tablet   Refills:  3                Primary Care Provider Office Phone # Fax #    Virgen Graham [phone]       [facility] [address] [place] [address]        Equal Access to Services     SARAHY MORALES AH: Hadii aad ku hadasho Sorandee, waaxda luqadaha, qaybta kaalmada adeegyada, marcus fernandez. So [facility] [phone].    ATENCIÓN: Si habla español, tiene a rodriguez disposición servicios gratuitos de asistencia lingüística. Llame al [phone].    We comply with applicable federal civil rights laws and [place] laws. We do not discriminate on the basis of race, color, national origin, age, disability, sex, sexual orientation, or gender identity.            Thank you!     Thank you for choosing [facility]  for your care. Our goal is always to provide you with excellent care. Hearing back from our patients " is one way we can continue to improve our services. Please take a few minutes to complete the written survey that you may receive in the mail after your visit with us. Thank you!             Your Updated Medication List - Protect others around you: Learn how to safely use, store and throw away your medicines at www.disposemymeds.org.          This list is accurate as of 12/7/18  8:34 AM.  Always use your most recent med list.                   Brand Name Dispense Instructions for use Diagnosis    albuterol 108 (90 Base) MCG/ACT inhaler    PROAIR HFA    1 Inhaler    Inhale 2 puffs into the lungs every 6 hours    Reactive airway disease with wheezing       AMBIEN PO      Take 10 mg by mouth nightly as needed for sleep        CVS GLUCOS-CHONDROIT TRIPLE ST PO           IBUPROFEN PO      Take 400 mg by mouth        montelukast 10 MG tablet    SINGULAIR    90 tablet    Take 1 tablet (10 mg) by mouth every evening    ILD (interstitial lung disease) (H)       OMEGA-3 FISH OIL PO      Take by mouth daily        predniSONE 1 MG tablet    DELTASONE    120 tablet    Take 9 mg daily for 1 month, then taper by 1 mg every month.    ILD (interstitial lung disease) (H)       PROBIOTIC PO       ILD (interstitial lung disease) (H)       VITAMIN D (CHOLECALCIFEROL) PO      Take by mouth daily        ZANTAC PO

## 2018-12-07 NOTE — LETTER
12/7/2018      RE: Meenakshi Mckeon  [address] [facility]  [place] [address]       [facility]    Reason for Visit  Meenakshi Mckeon is a 58 year old year old female who is being seen for Interstitial Lung Disease (ILD) (4 month follow up )    HPI    Ms. Granados is a 58-year-old with chronic eosinophilic pneumonia who is here for follow-up.  Her chronic eosinophilic pneumonia is felt to be associated with radiation therapy for her breast cancer.  There are case reports of this that have been published.  She has been treated with low-dose prednisone and montelukast, and I added the montelukast as a steroid sparing agent which has been described for chronic eosinophilic pneumonia.  She has done well with this combination of low-dose prednisone and montelukast without recurrent flares.  She was having recurrent flares when she first came to see me in clinic.    She had hip replacement on November 7 of her left hip.  She reports that the surgery went well.  We increased her prednisone to 20 mg 1 day prior and 2 days after surgery and then back to her baseline of 5 mg daily.  She did well with this.  She denies any symptoms of flare of her chronic eosinophilic pneumonia.  In the past, her symptoms that predicted flare included eye pain and systemic symptoms.  She denies shortness of breath, cough, fevers, or new skin rashes.  She is currently doing physical therapy for her hip and walks with a cane outside the home.  She has not yet received approval to restart exercise.  She did receive the flu vaccine.        Current Outpatient Prescriptions   Medication     Glucos-Chondroit-Boron-C-Mn ([employer] GLUCOS-CHONDROIT TRIPLE ST PO)     IBUPROFEN PO     montelukast (SINGULAIR) 10 MG tablet     Omega-3 Fatty Acids (OMEGA-3 FISH OIL PO)     predniSONE (DELTASONE) 1 MG tablet     Probiotic Product (PROBIOTIC PO)     RaNITidine HCl (ZANTAC PO)     VITAMIN D, CHOLECALCIFEROL, PO     Zolpidem  Tartrate (AMBIEN PO)     albuterol (PROAIR HFA) 108 (90 BASE) MCG/ACT Inhaler     No current facility-administered medications for this visit.      Allergies   Allergen Reactions     Kenalog [Triamcinolone] Swelling     Past Medical History:   Diagnosis Date     Atrial fibrillation (H) 10/2014     Cancer (H)     Breast DCIS dx 2013. Lumpectomy and radiation completed 1/2014     Gastro-oesophageal reflux disease      ILD (interstitial lung disease) (H)     chronic eosinophilic pneumonia associated with radiation therapy, CT-guided needle bx 2014 of LLL consolidation showed organizing eosinophlic pneumonia (reviewed by Soha at [facility]). Started prednisone 4/2014.  BAL 6/2014 showed no eos (?if she was on pred at the timeL; several flares as pred has been tapered     Lung nodule     7 mm RLL nodule first noted 2016     Right heart enlargement 2/8/2017       Past Surgical History:   Procedure Laterality Date     cubocalneal fusion       KNEE SURGERY       LUMPECTOMY BREAST Left 2013       Social History     Social History     Marital status:      Spouse name: N/A     Number of children: N/A     Years of education: N/A     Occupational History     Not on file.     Social History Main Topics     Smoking status: Former Smoker     Packs/day: 0.50     Years: 20.00     Types: Cigarettes     Quit date: 7/7/2000     Smokeless tobacco: Never Used     Alcohol use 0.0 oz/week     0 Standard drinks or equivalent per week     Drug use: No     Sexual activity: Not on file     Other Topics Concern     Not on file     Social History Narrative    No exposure to asbestos, hot tubs, birds, mold, silica.  Hobbies: gardening, biking.  Nurse at [employer].  .       Family History   Problem Relation Age of Onset     Cerebrovascular Disease Mother      Hypertension Mother      Chronic Obstructive Pulmonary Disease Mother      C.A.D. Maternal Grandfather      both grfa with MI             ROS Pulmonary  A  "complete ROS was otherwise negative except as noted in the HPI.    Vitals: /78 (BP Location: Right arm, Patient Position: Chair, Cuff Size: Adult Regular)  Pulse 92  Resp 16  Ht 1.626 m (5' 4.02\")  Wt 72.6 kg (160 lb)  SpO2 99%  BMI 27.45 kg/m2    Exam:   GENERAL APPEARANCE: Well developed, well nourished, alert, and in no apparent distress.  RESP: good air flow throughout.  No crackles. No rhonchi. No wheezes.  CV: Normal S1, S2, regular rhythm, normal rate. No murmur.  No LE edema.   MS: extremities normal. No clubbing. No cyanosis.  SKIN: no rash on limited exam.  NEURO: Mentation intact, speech normal, normal gait and stance.  PSYCH: mentation appears normal. and affect normal/bright.      Assessment and plan:   Ms. Granados is a 58-year-old with chronic eosinophilic pneumonia who is here for follow-up.  As stated above, her chronic eosinophilic pneumonia is felt to be associated with radiation therapy for her breast cancer.  She was having recurrent flares every time the prednisone was weaned off when she first came to see me in in clinic.  She has done well now with low-dose prednisone and montelukast combination.  The montelukast has been described as a steroid sparing agent agent in a few case reports for chronic eosinophilic pneumonia.  We will continue her current regimen of 5 mg daily prednisone and montelukast daily.  I have asked her to start exercising when she receivea the okay from her orthopedic surgeon.  She also should get annual flu vaccines as she is doing.  I will see her back in 6 months for follow-up.  She will call us if she develops symptoms of a flare before then.  In the past, we have tried to treat her with mepolizumab.  There is a scientific rationale for this as mepolizumab targets eosinophils.  However, her insurance would not approve payment for this.  This would be an option in the future if she has recurrent flares again.        Paradise Ge MD      "

## 2019-04-30 ENCOUNTER — DOCUMENTATION ONLY (OUTPATIENT)
Dept: CARE COORDINATION | Facility: CLINIC | Age: 59
End: 2019-04-30

## 2019-05-24 NOTE — LETTER
1/5/2018      RE: Meenakshi Mckeon  [address]  [facility] [address]       [facility]    Reason for Visit  Meenakshi Mckeon is a 57 year old year old female who is being seen for Interstitial Lung Disease (ILD) (Patient is being seen for ILD follow up)    HPI    57 year old female with hx of chronic eosinophilic pneumonia who is here for follow up. She was last seen in October, at which time she remained on prednisone 10 mg daily. Since that visit she has remained flare free and doing quite well. Her current prednisone dose is 7 mg but is due to decrease to 6 mg today. She remains very active and has not had any issues with dyspnea. She also remains on Singulair. Denies cough, SOB or other systemic symptoms.         Current Outpatient Prescriptions   Medication     IBUPROFEN PO     montelukast (SINGULAIR) 10 MG tablet     predniSONE (DELTASONE) 5 MG tablet     Probiotic Product (PROBIOTIC PO)     VITAMIN D, CHOLECALCIFEROL, PO     Omega-3 Fatty Acids (OMEGA-3 FISH OIL PO)     Zolpidem Tartrate (AMBIEN PO)     No current facility-administered medications for this visit.      Allergies   Allergen Reactions     Kenalog [Triamcinolone] Swelling     Past Medical History:   Diagnosis Date     Atrial fibrillation (H) 10/2014     Cancer (H)     Breast DCIS dx 2013. Lumpectomy and radiation completed 1/2014     Gastro-oesophageal reflux disease      ILD (interstitial lung disease) (H)     chronic eosinophilic pneumonia associated with radiation therapy, CT-guided needle bx 2014 of LLL consolidation showed organizing eosinophlic pneumonia (reviewed by Ethel and Cindy at [facility]). Started prednisone 4/2014.  BAL 6/2014 showed no eos (?if she was on pred at the timeL; several flares as pred has been tapered     Lung nodule     7 mm RLL nodule first noted 2016     Right heart enlargement 2/8/2017       Past Surgical History:   Procedure Laterality Date     cubocalneal fusion    Noted by team. Thank you for letting us know. Closing encounter.       KNEE SURGERY       LUMPECTOMY BREAST Left 2013       Social History     Social History     Marital status:      Spouse name: N/A     Number of children: N/A     Years of education: N/A     Occupational History     Not on file.     Social History Main Topics     Smoking status: Former Smoker     Packs/day: 0.50     Years: 20.00     Types: Cigarettes     Quit date: 7/7/2000     Smokeless tobacco: Never Used     Alcohol use 0.0 oz/week     0 Standard drinks or equivalent per week     Drug use: No     Sexual activity: Not on file     Other Topics Concern     Not on file     Social History Narrative    No exposure to asbestos, hot tubs, birds, mold, silica.  Hobbies: gardening, biking.  Nurse at [employer].  .       Family History   Problem Relation Age of Onset     CEREBROVASCULAR DISEASE Mother      Hypertension Mother      Chronic Obstructive Pulmonary Disease Mother      C.A.D. Maternal Grandfather      both grfa with MI             ROS Pulmonary  A complete ROS was otherwise negative except as noted in the HPI.    Vitals: BP (!) 143/92 (BP Location: Right arm, Patient Position: Chair, Cuff Size: Adult Regular)  Pulse 79  Resp 16  SpO2 99%    Exam:   GENERAL APPEARANCE: Well developed, well nourished, alert, and in no apparent distress.  LYMPHATICS: No significant cervical, or supraclavicular nodes.  RESP: good air flow throughout.  No crackles. No rhonchi. No wheezes.  CV: Normal S1, S2, regular rhythm, normal rate. No murmur.  No LE edema.   ABD: BS+, soft, nontender.  MS: extremities normal. No clubbing. No cyanosis.  SKIN: no rash on limited exam.  NEURO: Mentation intact, speech normal, normal gait and stance.  PSYCH: mentation appears normal. and affect normal/bright.      Assessment and plan:   57 year old female with hx of chronic eosinophilic pneumonia who is here for follow up.  1. Interstitial lung disease. Known chronic eosinophilic pneumonia without evidence of flare since  March 2017. Her LLL consolidation has since resolved on Chest CT. She remains on prednisone, and we have been tapering slowly. She is currently at 7 mg today but is due to decrease to 6 mg. Given her stable clinical status, would decrease her prednisone to 6 mg with plans to taper to 5 mg in a month. She has consistently flared when her dose has been below 5 mg, so would be reasonable to not taper below 5 mg at this time. She should remain on her Singulair, but if her symptoms are stable, can consider stopping in the future.  Her insurance has denied coverage for mepolizumab, which otherwise would be an option for treatment.  Return to clinic in 4 months.           Paradise Ge MD

## 2019-06-12 ASSESSMENT — ENCOUNTER SYMPTOMS
ARTHRALGIAS: 1
STIFFNESS: 1

## 2019-06-14 ENCOUNTER — OFFICE VISIT (OUTPATIENT)
Dept: PULMONOLOGY | Facility: CLINIC | Age: 59
End: 2019-06-14
Attending: INTERNAL MEDICINE
Payer: COMMERCIAL

## 2019-06-14 VITALS
HEIGHT: 64 IN | OXYGEN SATURATION: 97 % | HEART RATE: 71 BPM | SYSTOLIC BLOOD PRESSURE: 132 MMHG | BODY MASS INDEX: 26.46 KG/M2 | DIASTOLIC BLOOD PRESSURE: 80 MMHG | RESPIRATION RATE: 17 BRPM | WEIGHT: 155 LBS

## 2019-06-14 DIAGNOSIS — J84.9 ILD (INTERSTITIAL LUNG DISEASE) (H): Primary | ICD-10-CM

## 2019-06-14 PROCEDURE — G0463 HOSPITAL OUTPT CLINIC VISIT: HCPCS | Mod: ZF

## 2019-06-14 ASSESSMENT — MIFFLIN-ST. JEOR: SCORE: 1263.08

## 2019-06-14 ASSESSMENT — PAIN SCALES - GENERAL: PAINLEVEL: NO PAIN (0)

## 2019-06-14 NOTE — NURSING NOTE
Chief Complaint   Patient presents with     RECHECK     CHR EOS PNA    Medications reviewed and vital signs taken.   Eleno Garnett CMA

## 2019-06-14 NOTE — PROGRESS NOTES
[facility]    Reason for Visit  Meenakshi Mckeon is a 59 year old year old female who is being seen for RECHECK (CHR EOS PNA)    HPI    Ms. Granados is a 58-year-old with chronic eosinophilic pneumonia who is here for follow-up.  Her chronic eosinophilic pneumonia is felt to be associated with radiation therapy for her breast cancer.  There are case reports of this that have been published.  She was having recurrent flares when she first came to see me in clinic.  She has been treated with low-dose prednisone and montelukast, and I added the montelukast as a steroid sparing agent which has been described for chronic eosinophilic pneumonia.  She has done well with this combination of low-dose prednisone and montelukast without recurrent flares.      Today, she reports she is doing well.  She is swimming every day.  She denies fevers, rash, cough, chest pain, or syncope.  She experiences no unusual dyspnea.  Her chronic eosinophilic pneumonia flares start with eye pain and systemic symptoms.  She has not had any of those symptoms.  She continues to take prednisone 5 mg daily and montelukast 10 mg daily.      Current Outpatient Medications   Medication     albuterol (PROAIR HFA) 108 (90 BASE) MCG/ACT Inhaler     IBUPROFEN PO     montelukast (SINGULAIR) 10 MG tablet     Multiple Vitamins-Minerals (MULTIVITAMIN ADULT PO)     Omega-3 Fatty Acids (OMEGA-3 FISH OIL PO)     predniSONE (DELTASONE) 1 MG tablet     Probiotic Product (PROBIOTIC PO)     RaNITidine HCl (ZANTAC PO)     VITAMIN D, CHOLECALCIFEROL, PO     Zolpidem Tartrate (AMBIEN PO)     Glucos-Chondroit-Boron-C-Mn ([employer] GLUCOS-CHONDROIT TRIPLE ST PO)     No current facility-administered medications for this visit.      Allergies   Allergen Reactions     Kenalog [Triamcinolone] Swelling     Past Medical History:   Diagnosis Date     Atrial fibrillation (H) 10/2014     Cancer (H)     Breast DCIS dx 2013. Lumpectomy and  radiation completed 2014     Gastro-oesophageal reflux disease      ILD (interstitial lung disease) (H)     chronic eosinophilic pneumonia associated with radiation therapy, CT-guided needle bx  of LLL consolidation showed organizing eosinophlic pneumonia (reviewed by Ethel and Cindy at [facility]). Started prednisone 2014.  BAL 2014 showed no eos (?if she was on pred at the timeL; several flares as pred has been tapered     Lung nodule     7 mm RLL nodule first noted      Right heart enlargement 2017       Past Surgical History:   Procedure Laterality Date     cubocalneal fusion       KNEE SURGERY       LUMPECTOMY BREAST Left        Social History     Socioeconomic History     Marital status:      Spouse name: Not on file     Number of children: Not on file     Years of education: Not on file     Highest education level: Not on file   Occupational History     Not on file   Social Needs     Financial resource strain: Not on file     Food insecurity:     Worry: Not on file     Inability: Not on file     Transportation needs:     Medical: Not on file     Non-medical: Not on file   Tobacco Use     Smoking status: Former Smoker     Packs/day: 0.50     Years: 20.00     Pack years: 10.00     Types: Cigarettes     Last attempt to quit: 2000     Years since quittin.9     Smokeless tobacco: Never Used   Substance and Sexual Activity     Alcohol use: Yes     Alcohol/week: 0.0 oz     Drug use: No     Sexual activity: Not on file   Lifestyle     Physical activity:     Days per week: Not on file     Minutes per session: Not on file     Stress: Not on file   Relationships     Social connections:     Talks on phone: Not on file     Gets together: Not on file     Attends Jainism service: Not on file     Active member of club or organization: Not on file     Attends meetings of clubs or organizations: Not on file     Relationship status: Not on file     Intimate partner violence:     Fear of  "current or ex partner: Not on file     Emotionally abused: Not on file     Physically abused: Not on file     Forced sexual activity: Not on file   Other Topics Concern     Parent/sibling w/ CABG, MI or angioplasty before 65F 55M? Not Asked   Social History Narrative    No exposure to asbestos, hot tubs, birds, mold, silica.  Hobbies: gardening, biking.  Nurse at [employer].  .       Family History   Problem Relation Age of Onset     Cerebrovascular Disease Mother      Hypertension Mother      Chronic Obstructive Pulmonary Disease Mother      C.A.D. Maternal Grandfather         both grfa with MI             ROS Pulmonary  A complete ROS was otherwise negative except as noted in the HPI.    Vitals: /80   Pulse 71   Resp 17   Ht 1.626 m (5' 4\")   Wt 70.3 kg (155 lb)   SpO2 97%   BMI 26.61 kg/m      Exam:   GENERAL APPEARANCE: Well developed, well nourished, alert, and in no apparent distress.  RESP: good air flow throughout.  No crackles. No rhonchi. No wheezes.  CV: Normal S1, S2, regular rhythm, normal rate. No murmur.  No LE edema.   MS: extremities normal. No clubbing. No cyanosis.  SKIN: no rash on limited exam.  NEURO: Mentation intact, speech normal, normal gait and stance.  PSYCH: mentation appears normal. and affect normal/bright.      Assessment and plan:   Ms. Granados is a 58-year-old with chronic eosinophilic pneumonia who is here for follow-up.  As stated above, her chronic eosinophilic pneumonia is felt to be associated with radiation therapy for her breast cancer.  She was having recurrent flares every time the prednisone was weaned off when she first came to see me in in clinic.  She has done well now with low-dose prednisone and montelukast combination.  The montelukast has been described as a steroid sparing agent agent in a few case reports for chronic eosinophilic pneumonia.  She has had no flares for over a year.  We will continue prednisone 5 mg daily and montelukast 10 " mg daily.  If she has another flare, then we would increase her prednisone again.  However, mepolizumab also might be an option as it targets eosinophils.  Her insurance would not approve payment for this in the past.  We discussed bone health issues, and her primary care provider monitors her DEXA scan.  I encouraged her to stay active as she is doing, eat a diet rich in calcium, and take vitamin D.  She is doing all these interventions.  She will return in 1 year for follow-up with full pulmonary function test.  She is currently enrolled in the PFF registry.

## 2019-06-14 NOTE — LETTER
6/14/2019       RE: Meenakshi Mckeon  [address] [facility] [id number]     Dear Colleague,    Thank you for referring your patient, Meenakshi Mckeon, to the [facility] FOR LUNG SCIENCE AND HEALTH at [facility]. Please see a copy of my visit note below.    [facility]    Reason for Visit  Meenakshi Mckeon is a 59 year old year old female who is being seen for RECHECK (CHR EOS PNA)    HPI    Ms. Granados is a 58-year-old with chronic eosinophilic pneumonia who is here for follow-up.  Her chronic eosinophilic pneumonia is felt to be associated with radiation therapy for her breast cancer.  There are case reports of this that have been published.  She was having recurrent flares when she first came to see me in clinic.  She has been treated with low-dose prednisone and montelukast, and I added the montelukast as a steroid sparing agent which has been described for chronic eosinophilic pneumonia.  She has done well with this combination of low-dose prednisone and montelukast without recurrent flares.      Today, she reports she is doing well.  She is swimming every day.  She denies fevers, rash, cough, chest pain, or syncope.  She experiences no unusual dyspnea.  Her chronic eosinophilic pneumonia flares start with eye pain and systemic symptoms.  She has not had any of those symptoms.  She continues to take prednisone 5 mg daily and montelukast 10 mg daily.      Current Outpatient Medications   Medication     albuterol (PROAIR HFA) 108 (90 BASE) MCG/ACT Inhaler     IBUPROFEN PO     montelukast (SINGULAIR) 10 MG tablet     Multiple Vitamins-Minerals (MULTIVITAMIN ADULT PO)     Omega-3 Fatty Acids (OMEGA-3 FISH OIL PO)     predniSONE (DELTASONE) 1 MG tablet     Probiotic Product (PROBIOTIC PO)     RaNITidine HCl (ZANTAC PO)     VITAMIN D, CHOLECALCIFEROL, PO     Zolpidem Tartrate (AMBIEN PO)     Glucos-Chondroit-Boron-C-Mn ([employer]  GLUCOS-CHONDROIT TRIPLE ST PO)     No current facility-administered medications for this visit.      Allergies   Allergen Reactions     Kenalog [Triamcinolone] Swelling     Past Medical History:   Diagnosis Date     Atrial fibrillation (H) 10/2014     Cancer (H)     Breast DCIS dx . Lumpectomy and radiation completed 2014     Gastro-oesophageal reflux disease      ILD (interstitial lung disease) (H)     chronic eosinophilic pneumonia associated with radiation therapy, CT-guided needle bx  of LLL consolidation showed organizing eosinophlic pneumonia (reviewed by Soha at [facility]). Started prednisone 2014.  BAL 2014 showed no eos (?if she was on pred at the timeL; several flares as pred has been tapered     Lung nodule     7 mm RLL nodule first noted      Right heart enlargement 2017       Past Surgical History:   Procedure Laterality Date     cubocalneal fusion       KNEE SURGERY       LUMPECTOMY BREAST Left        Social History     Socioeconomic History     Marital status:      Spouse name: Not on file     Number of children: Not on file     Years of education: Not on file     Highest education level: Not on file   Occupational History     Not on file   Social Needs     Financial resource strain: Not on file     Food insecurity:     Worry: Not on file     Inability: Not on file     Transportation needs:     Medical: Not on file     Non-medical: Not on file   Tobacco Use     Smoking status: Former Smoker     Packs/day: 0.50     Years: 20.00     Pack years: 10.00     Types: Cigarettes     Last attempt to quit: 2000     Years since quittin.9     Smokeless tobacco: Never Used   Substance and Sexual Activity     Alcohol use: Yes     Alcohol/week: 0.0 oz     Drug use: No     Sexual activity: Not on file   Lifestyle     Physical activity:     Days per week: Not on file     Minutes per session: Not on file     Stress: Not on file   Relationships     Social  "connections:     Talks on phone: Not on file     Gets together: Not on file     Attends Bahai service: Not on file     Active member of club or organization: Not on file     Attends meetings of clubs or organizations: Not on file     Relationship status: Not on file     Intimate partner violence:     Fear of current or ex partner: Not on file     Emotionally abused: Not on file     Physically abused: Not on file     Forced sexual activity: Not on file   Other Topics Concern     Parent/sibling w/ CABG, MI or angioplasty before 65F 55M? Not Asked   Social History Narrative    No exposure to asbestos, hot tubs, birds, mold, silica.  Hobbies: gardening, biking.  Nurse at [employer].  .       Family History   Problem Relation Age of Onset     Cerebrovascular Disease Mother      Hypertension Mother      Chronic Obstructive Pulmonary Disease Mother      C.A.D. Maternal Grandfather         both grfa with MI             ROS Pulmonary  A complete ROS was otherwise negative except as noted in the HPI.    Vitals: /80   Pulse 71   Resp 17   Ht 1.626 m (5' 4\")   Wt 70.3 kg (155 lb)   SpO2 97%   BMI 26.61 kg/m       Exam:   GENERAL APPEARANCE: Well developed, well nourished, alert, and in no apparent distress.  RESP: good air flow throughout.  No crackles. No rhonchi. No wheezes.  CV: Normal S1, S2, regular rhythm, normal rate. No murmur.  No LE edema.   MS: extremities normal. No clubbing. No cyanosis.  SKIN: no rash on limited exam.  NEURO: Mentation intact, speech normal, normal gait and stance.  PSYCH: mentation appears normal. and affect normal/bright.      Assessment and plan:   Ms. Granados is a 58-year-old with chronic eosinophilic pneumonia who is here for follow-up.  As stated above, her chronic eosinophilic pneumonia is felt to be associated with radiation therapy for her breast cancer.  She was having recurrent flares every time the prednisone was weaned off when she first came to see me " in in clinic.  She has done well now with low-dose prednisone and montelukast combination.  The montelukast has been described as a steroid sparing agent agent in a few case reports for chronic eosinophilic pneumonia.  She has had no flares for over a year.  We will continue prednisone 5 mg daily and montelukast 10 mg daily.  If she has another flare, then we would increase her prednisone again.  However, mepolizumab also might be an option as it targets eosinophils.  Her insurance would not approve payment for this in the past.  We discussed bone health issues, and her primary care provider monitors her DEXA scan.  I encouraged her to stay active as she is doing, eat a diet rich in calcium, and take vitamin D.  She is doing all these interventions.  She will return in 1 year for follow-up with full pulmonary function test.  She is currently enrolled in the PFF registry    Again, thank you for allowing me to participate in the care of your patient.      Sincerely,    Paradise Ge MD

## 2019-07-29 DIAGNOSIS — J84.9 ILD (INTERSTITIAL LUNG DISEASE) (H): ICD-10-CM

## 2019-07-29 DIAGNOSIS — J84.9 ILD (INTERSTITIAL LUNG DISEASE) (H): Primary | ICD-10-CM

## 2019-07-29 RX ORDER — PREDNISONE 5 MG/1
5 TABLET ORAL DAILY
Qty: 90 TABLET | Refills: 3 | Status: SHIPPED | OUTPATIENT
Start: 2019-07-29 | End: 2020-08-24

## 2019-07-29 RX ORDER — PREDNISONE 1 MG/1
TABLET ORAL
Qty: 120 TABLET | Refills: 3 | OUTPATIENT
Start: 2019-07-29

## 2019-10-02 ENCOUNTER — HEALTH MAINTENANCE LETTER (OUTPATIENT)
Age: 59
End: 2019-10-02

## 2019-10-23 DIAGNOSIS — J84.9 ILD (INTERSTITIAL LUNG DISEASE) (H): ICD-10-CM

## 2019-10-23 RX ORDER — MONTELUKAST SODIUM 10 MG/1
TABLET ORAL
Qty: 90 TABLET | Refills: 0 | Status: SHIPPED | OUTPATIENT
Start: 2019-10-23 | End: 2020-01-06

## 2019-10-29 NOTE — TELEPHONE ENCOUNTER
DIAGNOSIS: Rt Wrist Pain, Small Bump on inside of wrist, No Imaging, No Surgeries, Per Pt   APPOINTMENT DATE: 10/29     NOTES STATUS DETAILS   OFFICE NOTE from referring provider N/A    OFFICE NOTE from other specialist N/A    DISCHARGE SUMMARY from hospital N/A    DISCHARGE REPORT from the ER N/A    OPERATIVE REPORT N/A    MEDICATION LIST Internal    MRI N/A    CT SCAN N/A    XRAYS (IMAGES & REPORTS) N/A

## 2019-11-04 ENCOUNTER — PRE VISIT (OUTPATIENT)
Dept: ORTHOPEDICS | Facility: CLINIC | Age: 59
End: 2019-11-04

## 2019-11-04 ENCOUNTER — ANCILLARY PROCEDURE (OUTPATIENT)
Dept: GENERAL RADIOLOGY | Facility: CLINIC | Age: 59
End: 2019-11-04
Attending: FAMILY MEDICINE
Payer: COMMERCIAL

## 2019-11-04 ENCOUNTER — OFFICE VISIT (OUTPATIENT)
Dept: ORTHOPEDICS | Facility: CLINIC | Age: 59
End: 2019-11-04
Payer: COMMERCIAL

## 2019-11-04 VITALS
SYSTOLIC BLOOD PRESSURE: 128 MMHG | HEIGHT: 64 IN | BODY MASS INDEX: 26.61 KG/M2 | DIASTOLIC BLOOD PRESSURE: 88 MMHG | HEART RATE: 76 BPM

## 2019-11-04 DIAGNOSIS — M65.4 TENOSYNOVITIS, DE QUERVAIN: ICD-10-CM

## 2019-11-04 DIAGNOSIS — M19.031 PRIMARY OSTEOARTHRITIS OF RIGHT WRIST: ICD-10-CM

## 2019-11-04 DIAGNOSIS — M67.431 GANGLION CYST OF WRIST, RIGHT: ICD-10-CM

## 2019-11-04 DIAGNOSIS — M65.4 TENOSYNOVITIS, DE QUERVAIN: Primary | ICD-10-CM

## 2019-11-04 NOTE — LETTER
"  11/4/2019      RE: Meenakshi cMkeon  [address] [facility] [id number]       Sports Medicine Clinic Visit    PCP: Virgen Graham    Meenakshi Mckeon is a 59 year old RHD female who is seen  as self referral presenting with right wrist pain.     Injury: None    Location of Pain: right radial wrist   Duration of Pain: 1 year  Rating of Pain: Sharp pain with use otherwise dull, throbbing pain  Pain is better with: Ice  Pain is worse with: Use of scissors, wrist flexion, gripping, WB on hand  Additional Features: Volar mass that has been present for a couple years, weakness, swelling  Treatment so far consists of: Ice  Prior History of related problems: None    /88 (BP Location: Right arm, Patient Position: Sitting, Cuff Size: Adult Regular)   Pulse 76   Ht 1.626 m (5' 4\")   BMI 26.61 kg/m            PMH:  Past Medical History:   Diagnosis Date     Atrial fibrillation (H) 10/2014     Cancer (H)     Breast DCIS dx 2013. Lumpectomy and radiation completed 1/2014     Gastro-oesophageal reflux disease      ILD (interstitial lung disease) (H)     chronic eosinophilic pneumonia associated with radiation therapy, CT-guided needle bx 2014 of LLL consolidation showed organizing eosinophlic pneumonia (reviewed by Ethel and Cindy at [facility]). Started prednisone 4/2014.  BAL 6/2014 showed no eos (?if she was on pred at the timeL; several flares as pred has been tapered     Lung nodule     7 mm RLL nodule first noted 2016     Right heart enlargement 2/8/2017       Active problem list:  Patient Active Problem List   Diagnosis     Atrial fibrillation (H)     ILD (interstitial lung disease) (H)     Right heart enlargement     Eosinophilic pneumonia (H)       FH:  Family History   Problem Relation Age of Onset     Cerebrovascular Disease Mother      Hypertension Mother      Chronic Obstructive Pulmonary Disease Mother      C.A.D. Maternal Grandfather         both grfa with MI       SH:  Social History "     Socioeconomic History     Marital status:      Spouse name: Not on file     Number of children: Not on file     Years of education: Not on file     Highest education level: Not on file   Occupational History     Not on file   Social Needs     Financial resource strain: Not on file     Food insecurity:     Worry: Not on file     Inability: Not on file     Transportation needs:     Medical: Not on file     Non-medical: Not on file   Tobacco Use     Smoking status: Former Smoker     Packs/day: 0.50     Years: 20.00     Pack years: 10.00     Types: Cigarettes     Last attempt to quit: 2000     Years since quittin.3     Smokeless tobacco: Never Used   Substance and Sexual Activity     Alcohol use: Yes     Alcohol/week: 0.0 standard drinks     Drug use: No     Sexual activity: Not on file   Lifestyle     Physical activity:     Days per week: Not on file     Minutes per session: Not on file     Stress: Not on file   Relationships     Social connections:     Talks on phone: Not on file     Gets together: Not on file     Attends Judaism service: Not on file     Active member of club or organization: Not on file     Attends meetings of clubs or organizations: Not on file     Relationship status: Not on file     Intimate partner violence:     Fear of current or ex partner: Not on file     Emotionally abused: Not on file     Physically abused: Not on file     Forced sexual activity: Not on file   Other Topics Concern     Parent/sibling w/ CABG, MI or angioplasty before 65F 55M? Not Asked   Social History Narrative    No exposure to asbestos, hot tubs, birds, mold, silica.  Hobbies: gardening, biking.  Nurse at [employer].  .       MEDS:  See EMR, reviewed  ALL:  See EMR, reviewed    REVIEW OF SYSTEMS:  CONSTITUTIONAL:NEGATIVE for fever, chills, change in weight  INTEGUMENTARY/SKIN: NEGATIVE for worrisome rashes, moles or lesions  EYES: NEGATIVE for vision changes or irritation  ENT/MOUTH:  NEGATIVE for ear, mouth and throat problems  RESP:NEGATIVE for significant cough or SOB  BREAST: NEGATIVE for masses, tenderness or discharge  CV: NEGATIVE for chest pain, palpitations or peripheral edema  GI: NEGATIVE for nausea, abdominal pain, heartburn, or change in bowel habits  :NEGATIVE for frequency, dysuria, or hematuria  :NEGATIVE for frequency, dysuria, or hematuria  NEURO: NEGATIVE for weakness, dizziness or paresthesias  ENDOCRINE: NEGATIVE for temperature intolerance, skin/hair changes  HEME/ALLERGY/IMMUNE: NEGATIVE for bleeding problems  PSYCHIATRIC: NEGATIVE for changes in mood or affect        Objective: She points to the first dorsal compartment of the right wrist as her area of discomfort.  She does have tenderness over the radial styloid and a Finkelstein's test is positive.  There is normal range of motion at the CMC joint of the thumb and grind test is negative.  She has what appears to be a palpable ganglion cyst along the volar aspect of the distal radius at the level of the wrist that she says has been chronic for the past year 2.  The overlying skin is intact and is nontender.  No effusion at the wrist.  Normal range of motion of the wrist.  Nontender over the scaphoid or lunate.  Grasp strength is normal.  Sensation is intact distally.  Appropriate in conversation and affect.    An x-ray shows no bony abnormality at the CMC joint but she does have significant degenerative change at the STT joint.  Also mild degenerative change at the MCP joint.    Assessment: De Quervain's tenosynovitis.  Left volar ganglion cyst right wrist  Right wrist djd    Plan: She would like to start out with conservative options.  She will try a thumb spica brace over the next 4 to 6 weeks.  She can remove it for typing at work.  She is not improving the first dorsal compartment cortisone shot could be considered.  Another option would be an MRI to see if the ganglion cyst has an intra-articular component that  may be giving her trouble with persistent pain even after her thumb tendon becomes asymptomatic.  She will follow-up if not improved with the brace and an MRI could be considered or the option of surgical referral for the ganglion cyst.  Alternatively she knows that in image guided STT cortisone injection under x-ray in the radiology department could also be considered.  We will discuss it further if the problem does not improve with a trial of the brace.    This note was created with the use of Dragon software and unintentional spelling or errors may have occurred.      Fran Wang MD

## 2019-11-04 NOTE — PROGRESS NOTES
"Sports Medicine Clinic Visit    PCP: Virgen Graham SARA Mckeon is a 59 year old RHD female who is seen  as self referral presenting with right wrist pain.     Injury: None    Location of Pain: right radial wrist   Duration of Pain: 1 year  Rating of Pain: Sharp pain with use otherwise dull, throbbing pain  Pain is better with: Ice  Pain is worse with: Use of scissors, wrist flexion, gripping, WB on hand  Additional Features: Volar mass that has been present for a couple years, weakness, swelling  Treatment so far consists of: Ice  Prior History of related problems: None    /88 (BP Location: Right arm, Patient Position: Sitting, Cuff Size: Adult Regular)   Pulse 76   Ht 1.626 m (5' 4\")   BMI 26.61 kg/m           PMH:  Past Medical History:   Diagnosis Date     Atrial fibrillation (H) 10/2014     Cancer (H)     Breast DCIS dx 2013. 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 Started prednisone 4/2014.  BAL 6/2014 showed no eos (?if she was on pred at the timeL; several flares as pred has been tapered     Lung nodule     7 mm RLL nodule first noted 2016     Right heart enlargement 2/8/2017       Active problem list:  Patient Active Problem List   Diagnosis     Atrial fibrillation (H)     ILD (interstitial lung disease) (H)     Right heart enlargement     Eosinophilic pneumonia (H)       FH:  Family History   Problem Relation Age of Onset     Cerebrovascular Disease Mother      Hypertension Mother      Chronic Obstructive Pulmonary Disease Mother      C.A.D. Maternal Grandfather         both grfa with MI       SH:  Social History     Socioeconomic History     Marital status:      Spouse name: Not on file     Number of " children: Not on file     Years of education: Not on file     Highest education level: Not on file   Occupational History     Not on file   Social Needs     Financial resource strain: Not on file     Food insecurity:     Worry: Not on file     Inability: Not on file     Transportation needs:     Medical: Not on file     Non-medical: Not on file   Tobacco Use     Smoking status: Former Smoker     Packs/day: 0.50     Years: 20.00     Pack years: 10.00     Types: Cigarettes     Last attempt to quit: 2000     Years since quittin.3     Smokeless tobacco: Never Used   Substance and Sexual Activity     Alcohol use: Yes     Alcohol/week: 0.0 standard drinks     Drug use: No     Sexual activity: Not on file   Lifestyle     Physical activity:     Days per week: Not on file     Minutes per session: Not on file     Stress: Not on file   Relationships     Social connections:     Talks on phone: Not on file     Gets together: Not on file     Attends Mandaeism service: Not on file     Active member of club or organization: Not on file     Attends meetings of clubs or organizations: Not on file     Relationship status: Not on file     Intimate partner violence:     Fear of current or ex partner: Not on file     Emotionally abused: Not on file     Physically abused: Not on file     Forced sexual activity: Not on file   Other Topics Concern     Parent/sibling w/ CABG, MI or angioplasty before 65F 55M? Not Asked   Social History Narrative    No exposure to asbestos, hot tubs, birds, mold, silica.  Hobbies: gardening, biking.  Nurse at [employer].  .       MEDS:  See EMR, reviewed  ALL:  See EMR, reviewed    REVIEW OF SYSTEMS:  CONSTITUTIONAL:NEGATIVE for fever, chills, change in weight  INTEGUMENTARY/SKIN: NEGATIVE for worrisome rashes, moles or lesions  EYES: NEGATIVE for vision changes or irritation  ENT/MOUTH: NEGATIVE for ear, mouth and throat problems  RESP:NEGATIVE for significant cough or SOB  BREAST:  NEGATIVE for masses, tenderness or discharge  CV: NEGATIVE for chest pain, palpitations or peripheral edema  GI: NEGATIVE for nausea, abdominal pain, heartburn, or change in bowel habits  :NEGATIVE for frequency, dysuria, or hematuria  :NEGATIVE for frequency, dysuria, or hematuria  NEURO: NEGATIVE for weakness, dizziness or paresthesias  ENDOCRINE: NEGATIVE for temperature intolerance, skin/hair changes  HEME/ALLERGY/IMMUNE: NEGATIVE for bleeding problems  PSYCHIATRIC: NEGATIVE for changes in mood or affect        Objective: She points to the first dorsal compartment of the right wrist as her area of discomfort.  She does have tenderness over the radial styloid and a Finkelstein's test is positive.  There is normal range of motion at the CMC joint of the thumb and grind test is negative.  She has what appears to be a palpable ganglion cyst along the volar aspect of the distal radius at the level of the wrist that she says has been chronic for the past year 2.  The overlying skin is intact and is nontender.  No effusion at the wrist.  Normal range of motion of the wrist.  Nontender over the scaphoid or lunate.  Grasp strength is normal.  Sensation is intact distally.  Appropriate in conversation and affect.    An x-ray shows no bony abnormality at the CMC joint but she does have significant degenerative change at the STT joint.  Also mild degenerative change at the MCP joint.    Assessment: De Quervain's tenosynovitis.  Left volar ganglion cyst right wrist  Right wrist djd    Plan: She would like to start out with conservative options.  She will try a thumb spica brace over the next 4 to 6 weeks.  She can remove it for typing at work.  She is not improving the first dorsal compartment cortisone shot could be considered.  Another option would be an MRI to see if the ganglion cyst has an intra-articular component that may be giving her trouble with persistent pain even after her thumb tendon becomes asymptomatic.   She will follow-up if not improved with the brace and an MRI could be considered or the option of surgical referral for the ganglion cyst.  Alternatively she knows that in image guided STT cortisone injection under x-ray in the radiology department could also be considered.  We will discuss it further if the problem does not improve with a trial of the brace.    This note was created with the use of Dragon software and unintentional spelling or errors may have occurred.

## 2019-11-04 NOTE — LETTER
[facility]  Department of Orthopaedic Surgery  [facility] [address], [address]  [place], [address]  [phone]          2019    Meenakshi Mckeon  [address] [place] [id number] [phone] (home) [phone] (work)    :     1960      To Whom it May Concern:    This patient will be wearing a wrist brace for right thumb tendinitis for the next  4-6 weeks.  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       Sincerely,        Fran Wang MD

## 2020-01-06 DIAGNOSIS — J84.9 ILD (INTERSTITIAL LUNG DISEASE) (H): ICD-10-CM

## 2020-01-06 RX ORDER — MONTELUKAST SODIUM 10 MG/1
TABLET ORAL
Qty: 90 TABLET | Refills: 0 | Status: SHIPPED | OUTPATIENT
Start: 2020-01-06 | End: 2020-01-22

## 2020-01-22 DIAGNOSIS — J84.9 ILD (INTERSTITIAL LUNG DISEASE) (H): ICD-10-CM

## 2020-01-22 RX ORDER — MONTELUKAST SODIUM 10 MG/1
TABLET ORAL
Qty: 90 TABLET | Refills: 0 | Status: SHIPPED | OUTPATIENT
Start: 2020-01-22 | End: 2020-04-17

## 2020-03-22 ENCOUNTER — HEALTH MAINTENANCE LETTER (OUTPATIENT)
Age: 60
End: 2020-03-22

## 2020-03-24 ENCOUNTER — PATIENT OUTREACH (OUTPATIENT)
Dept: PULMONOLOGY | Facility: CLINIC | Age: 60
End: 2020-03-24

## 2020-03-24 NOTE — PROGRESS NOTES
Discussed concerns around COVID-19 and encouraged patient to seek additional resource on Centers for Disease Control (CDC) website. 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 Will have letter provided to patient to supply to her work place ([employer]).  Patient verbalized understanding of plan.

## 2020-04-17 DIAGNOSIS — J84.9 ILD (INTERSTITIAL LUNG DISEASE) (H): ICD-10-CM

## 2020-04-17 RX ORDER — MONTELUKAST SODIUM 10 MG/1
TABLET ORAL
Qty: 90 TABLET | Refills: 4 | Status: SHIPPED | OUTPATIENT
Start: 2020-04-17 | End: 2021-04-30

## 2020-04-21 ENCOUNTER — DOCUMENTATION ONLY (OUTPATIENT)
Dept: CARE COORDINATION | Facility: CLINIC | Age: 60
End: 2020-04-21

## 2020-04-22 ENCOUNTER — PATIENT OUTREACH (OUTPATIENT)
Dept: PULMONOLOGY | Facility: CLINIC | Age: 60
End: 2020-04-22

## 2020-04-22 NOTE — PROGRESS NOTES
Discussed concerns around COVID and the possibility of returning to work. Patient works at [employer] orth[employer]. Has been off on leave and is wondering if it would be safe or advisable to to return to work. Stated they now have more precautions at work, universal masking and are seeing a lot less patients. She would be considering return to work for about 2 days a week or continue with leave unpaid. She stated finically she could continue with leave, just trying to make decision if she should go back to work? Notified Dr Ge, she is still recommending not to go back to work. Informed patient, she will consider and let her work know. She will contact us if she needs a letter or anything

## 2020-06-19 ENCOUNTER — VIRTUAL VISIT (OUTPATIENT)
Dept: PULMONOLOGY | Facility: CLINIC | Age: 60
End: 2020-06-19
Attending: INTERNAL MEDICINE
Payer: COMMERCIAL

## 2020-06-19 DIAGNOSIS — J84.9 ILD (INTERSTITIAL LUNG DISEASE) (H): Primary | ICD-10-CM

## 2020-06-19 RX ORDER — NICOTINE POLACRILEX 4 MG/1
1 GUM, CHEWING ORAL
COMMUNITY

## 2020-06-19 ASSESSMENT — ENCOUNTER SYMPTOMS
SNORES LOUDLY: 1
SPUTUM PRODUCTION: 0
COUGH DISTURBING SLEEP: 0
POSTURAL DYSPNEA: 0
DYSPNEA ON EXERTION: 0
WHEEZING: 1
HEMOPTYSIS: 0
COUGH: 0
SHORTNESS OF BREATH: 1

## 2020-06-19 NOTE — PROGRESS NOTES
"Meenakshi Mckeon is a 60 year old female who is being evaluated via a billable video visit.      The patient has been notified of following:     \"This video visit will be conducted via a call between you and your physician/provider. We have found that certain health care needs can be provided without the need for an in-person physical exam.  This service lets us provide the care you need with a video conversation.  If a prescription is necessary we can send it directly to your pharmacy.  If lab work is needed we can place an order for that and you can then stop by our lab to have the test done at a later time.    Video visits are billed at different rates depending on your insurance coverage.  Please reach out to your insurance provider with any questions.    If during the course of the call the physician/provider feels a video visit is not appropriate, you will not be charged for this service.\"    Patient has given verbal consent for Video visit? Yes    Will anyone else be joining your video visit? No        Video-Visit Details    Type of service:  Video Visit    Video Start Time: 8:02 AM  Video End Time: 8:29 AM    Originating Location (pt. Location):  Home    Distant Location (provider location):  [facility]     Platform used for Video Visit: [facility]    Reason for Visit  Meenakshi Mckeon is a 60 year old year old female who is being seen for RECHECK (ILD follow up )    HPI  Ms. Granados is a 60-year-old with chronic eosinophilic pneumonia with whom I had a video visit today.  Her chronic eosinophilic pneumonia is felt to be associated with radiation therapy for her breast cancer.  There are case reports of this that have been published.  She was having recurrent flares when she first came to see me in clinic.  She has been treated with low-dose prednisone and montelukast, and I added the montelukast as a steroid sparing " agent which has been described for chronic eosinophilic pneumonia.  She has done well with this combination.    Today, she reports that she has done well over the past year since her last clinic visit.  She continues to take prednisone 5 mg daily and montelukast.  She has had no symptoms to suggest a recurrence of her chronic eosinophilic pneumonia.  She does note that she gets a little short of breath when outside when the weather is hot and humid and feels a little chest tightness.  This resolves when she goes inside to air conditioning.  She is active and gardens, walks, swims, and does yoga.  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  She has no further symptoms of eye pain or systemic symptoms that have been the harbingers of a recurrence of her disease in the past.  She continues to have occasional chest wall pain that comes and goes since her radiation therapy.  She is practicing social distancing.          Current Outpatient Medications   Medication     IBUPROFEN PO     montelukast (SINGULAIR) 10 MG tablet     Multiple Vitamins-Minerals (MULTIVITAMIN ADULT PO)     Omega-3 Fatty Acids (OMEGA-3 FISH OIL PO)     omeprazole 20 MG tablet     predniSONE (DELTASONE) 5 MG tablet     VITAMIN D, CHOLECALCIFEROL, PO     Zolpidem Tartrate (AMBIEN PO)     albuterol (PROAIR HFA) 108 (90 BASE) MCG/ACT Inhaler     Glucos-Chondroit-Boron-C-Mn ([employer] GLUCOS-CHONDROIT TRIPLE ST PO)     Probiotic Product (PROBIOTIC PO)     RaNITidine HCl (ZANTAC PO)     No current facility-administered medications for this visit.      Allergies   Allergen Reactions     Kenalog [Triamcinolone] Swelling     Past Medical History:   Diagnosis Date     Atrial fibrillation (H) 10/2014     Cancer (H)     Breast DCIS dx 2013. Lumpectomy and radiation completed 1/2014     Gastro-oesophageal reflux disease      ILD (interstitial lung disease) (H)     chronic eosinophilic pneumonia associated with radiation therapy, CT-guided needle bx   of LLL consolidation showed organizing eosinophlic pneumonia (reviewed by Ethel and Cindy at [facility]). Started prednisone 2014.  BAL 2014 showed no eos (?if she was on pred at the timeL; several flares as pred has been tapered     Lung nodule     7 mm RLL nodule first noted      Right heart enlargement 2017       Past Surgical History:   Procedure Laterality Date     cubocalneal fusion       KNEE SURGERY       LUMPECTOMY BREAST Left        Social History     Socioeconomic History     Marital status:      Spouse name: Not on file     Number of children: Not on file     Years of education: Not on file     Highest education level: Not on file   Occupational History     Not on file   Social Needs     Financial resource strain: Not on file     Food insecurity     Worry: Not on file     Inability: Not on file     Transportation needs     Medical: Not on file     Non-medical: Not on file   Tobacco Use     Smoking status: Former Smoker     Packs/day: 0.50     Years: 20.00     Pack years: 10.00     Types: Cigarettes     Last attempt to quit: 2000     Years since quittin.9     Smokeless tobacco: Never Used   Substance and Sexual Activity     Alcohol use: Yes     Alcohol/week: 0.0 standard drinks     Drug use: No     Sexual activity: Not on file   Lifestyle     Physical activity     Days per week: Not on file     Minutes per session: Not on file     Stress: Not on file   Relationships     Social connections     Talks on phone: Not on file     Gets together: Not on file     Attends Sabianist service: Not on file     Active member of club or organization: Not on file     Attends meetings of clubs or organizations: Not on file     Relationship status: Not on file     Intimate partner violence     Fear of current or ex partner: Not on file     Emotionally abused: Not on file     Physically abused: Not on file     Forced sexual activity: Not on file   Other Topics Concern     Parent/sibling  "w/ CABG, MI or angioplasty before 65F 55M? Not Asked   Social History Narrative    No exposure to asbestos, hot tubs, birds, mold, silica.  Hobbies: gardening, biking.  Nurse at [employer].  .       Family History   Problem Relation Age of Onset     Cerebrovascular Disease Mother      Hypertension Mother      Chronic Obstructive Pulmonary Disease Mother      C.A.D. Maternal Grandfather         both grfa with MI             ROS Pulmonary  A complete ROS was otherwise negative except as noted in the HPI.    Vitals: There were no vitals taken for this visit.    Exam:   \"GENERAL: Healthy, alert and no distress\",\"EYES: Eyes grossly normal to inspection.  No discharge or erythema, or obvious scleral/conjunctival abnormalities.\",\"RESP: No audible wheeze, cough, or visible cyanosis.  No visible retractions or increased work of breathing.  \",\"SKIN: Visible skin clear. No significant rash, abnormal pigmentation or lesions.\",\"NEURO: Cranial nerves grossly intact.  Mentation and speech appropriate for age.\",\"PSYCH: Mentation appears normal, affect normal/bright, judgement and insight intact, normal speech and appearance well-groomed.    Assessment and plan:   Ms. Granados is a 60-year-old with chronic eosinophilic pneumonia with whom I had a video visit today.  Her chronic eosinophilic pneumonia is felt to be associated with radiation therapy for her breast cancer.  She has had no recurrences on her current regimen.  She overall is doing well.  I recommend that she continue to take prednisone 5 mg daily and montelukast, and she is in agreement with this plan.  She should get annual flu vaccine in the fall, and she does that regularly.  I encouraged her to continue her physical activity as she is doing.  I also recommended that she continue to practice social distancing, avoid large groups, and wear a mask when she goes outside the house.  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  She would need to continue to socially distance and wear a mask at work or work from home if that is possible.  I will see her back in 1 year with full PFT.  She was call us in the meantime if she has recurrent symptoms that suggest a flare of her chronic eosinophilic pneumonia.  She will send us a form to fill out for return to work and we could also give her a letter if required by her employer.  In the past, her insurance company would not cover mepolizumab for her chronic eosinophilic Toño pneumonia, but that would be a potential treatment in the future as it targets eosinophils, if she requires further treatment.            Answers for HPI/ROS submitted by the patient on 6/19/2020   General Symptoms: No  Skin Symptoms: No  HENT Symptoms: No  EYE SYMPTOMS: No  HEART SYMPTOMS: No  LUNG SYMPTOMS: Yes  INTESTINAL SYMPTOMS: No  URINARY SYMPTOMS: No  GYNECOLOGIC SYMPTOMS: No  BREAST SYMPTOMS: No  SKELETAL SYMPTOMS: No  BLOOD SYMPTOMS: No  NERVOUS SYSTEM SYMPTOMS: No  MENTAL HEALTH SYMPTOMS: No  Cough: No  Sputum or phlegm: No  Coughing up blood: No  Difficulty breating or shortness of breath: Yes  Snoring: Yes  Wheezing: Yes  Difficulty breathing on exertion: No  Nighttime Cough: No  Difficulty breathing when lying flat: No

## 2020-06-19 NOTE — LETTER
"    6/19/2020         RE: Meenakshi Mckeon  [address] [id number]        Dear Colleague,    Thank you for referring your patient, Meenakshi Mckeon, to the [facility]. Please see a copy of my visit note below.    Meenakshi Mckeon is a 60 year old female who is being evaluated via a billable video visit.      The patient has been notified of following:     \"This video visit will be conducted via a call between you and your physician/provider. We have found that certain health care needs can be provided without the need for an in-person physical exam.  This service lets us provide the care you need with a video conversation.  If a prescription is necessary we can send it directly to your pharmacy.  If lab work is needed we can place an order for that and you can then stop by our lab to have the test done at a later time.    Video visits are billed at different rates depending on your insurance coverage.  Please reach out to your insurance provider with any questions.    If during the course of the call the physician/provider feels a video visit is not appropriate, you will not be charged for this service.\"    Patient has given verbal consent for Video visit? Yes    Will anyone else be joining your video visit? No        Video-Visit Details    Type of service:  Video Visit    Video Start Time: 8:02 AM  Video End Time: 8:29 AM    Originating Location (pt. Location):  Home    Distant Location (provider location):  [facility]     Platform used for Video Visit: [facility]    Reason for Visit  Meenakshi Mckeon is a 60 year old year old female who is being seen for RECHECK (ILD follow up )    HPI  Ms. Granados is a 60-year-old with chronic eosinophilic pneumonia with whom I had a video visit today.  Her chronic eosinophilic pneumonia is felt to be associated with radiation therapy for her " breast cancer.  There are case reports of this that have been published.  She was having recurrent flares when she first came to see me in clinic.  She has been treated with low-dose prednisone and montelukast, and I added the montelukast as a steroid sparing agent which has been described for chronic eosinophilic pneumonia.  She has done well with this combination.    Today, she reports that she has done well over the past year since her last clinic visit.  She continues to take prednisone 5 mg daily and montelukast.  She has had no symptoms to suggest a recurrence of her chronic eosinophilic pneumonia.  She does note that she gets a little short of breath when outside when the weather is hot and humid and feels a little chest tightness.  This resolves when she goes inside to air conditioning.  She is active and gardens, walks, swims, and does yoga.  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  She has no further symptoms of eye pain or systemic symptoms that have been the harbingers of a recurrence of her disease in the past.  She continues to have occasional chest wall pain that comes and goes since her radiation therapy.  She is practicing social distancing.          Current Outpatient Medications   Medication     IBUPROFEN PO     montelukast (SINGULAIR) 10 MG tablet     Multiple Vitamins-Minerals (MULTIVITAMIN ADULT PO)     Omega-3 Fatty Acids (OMEGA-3 FISH OIL PO)     omeprazole 20 MG tablet     predniSONE (DELTASONE) 5 MG tablet     VITAMIN D, CHOLECALCIFEROL, PO     Zolpidem Tartrate (AMBIEN PO)     albuterol (PROAIR HFA) 108 (90 BASE) MCG/ACT Inhaler     Glucos-Chondroit-Boron-C-Mn ([employer] GLUCOS-CHONDROIT TRIPLE ST PO)     Probiotic Product (PROBIOTIC PO)     RaNITidine HCl (ZANTAC PO)     No current facility-administered medications for this visit.      Allergies   Allergen Reactions     Kenalog [Triamcinolone] Swelling     Past Medical History:   Diagnosis Date     Atrial fibrillation  (H) 10/2014     Cancer (H)     Breast DCIS dx . Lumpectomy and radiation completed 2014     Gastro-oesophageal reflux disease      ILD (interstitial lung disease) (H)     chronic eosinophilic pneumonia associated with radiation therapy, CT-guided needle bx  of LLL consolidation showed organizing eosinophlic pneumonia (reviewed by Soha at [facility]). Started prednisone 2014.  BAL 2014 showed no eos (?if she was on pred at the timeL; several flares as pred has been tapered     Lung nodule     7 mm RLL nodule first noted      Right heart enlargement 2017       Past Surgical History:   Procedure Laterality Date     cubocalneal fusion       KNEE SURGERY       LUMPECTOMY BREAST Left        Social History     Socioeconomic History     Marital status:      Spouse name: Not on file     Number of children: Not on file     Years of education: Not on file     Highest education level: Not on file   Occupational History     Not on file   Social Needs     Financial resource strain: Not on file     Food insecurity     Worry: Not on file     Inability: Not on file     Transportation needs     Medical: Not on file     Non-medical: Not on file   Tobacco Use     Smoking status: Former Smoker     Packs/day: 0.50     Years: 20.00     Pack years: 10.00     Types: Cigarettes     Last attempt to quit: 2000     Years since quittin.9     Smokeless tobacco: Never Used   Substance and Sexual Activity     Alcohol use: Yes     Alcohol/week: 0.0 standard drinks     Drug use: No     Sexual activity: Not on file   Lifestyle     Physical activity     Days per week: Not on file     Minutes per session: Not on file     Stress: Not on file   Relationships     Social connections     Talks on phone: Not on file     Gets together: Not on file     Attends Religion service: Not on file     Active member of club or organization: Not on file     Attends meetings of clubs or organizations: Not on file      "Relationship status: Not on file     Intimate partner violence     Fear of current or ex partner: Not on file     Emotionally abused: Not on file     Physically abused: Not on file     Forced sexual activity: Not on file   Other Topics Concern     Parent/sibling w/ CABG, MI or angioplasty before 65F 55M? Not Asked   Social History Narrative    No exposure to asbestos, hot tubs, birds, mold, silica.  Hobbies: gardening, biking.  Nurse at [employer].  .       Family History   Problem Relation Age of Onset     Cerebrovascular Disease Mother      Hypertension Mother      Chronic Obstructive Pulmonary Disease Mother      C.A.D. Maternal Grandfather         both grfa with MI             ROS Pulmonary  A complete ROS was otherwise negative except as noted in the HPI.    Vitals: There were no vitals taken for this visit.    Exam:   \"GENERAL: Healthy, alert and no distress\",\"EYES: Eyes grossly normal to inspection.  No discharge or erythema, or obvious scleral/conjunctival abnormalities.\",\"RESP: No audible wheeze, cough, or visible cyanosis.  No visible retractions or increased work of breathing.  \",\"SKIN: Visible skin clear. No significant rash, abnormal pigmentation or lesions.\",\"NEURO: Cranial nerves grossly intact.  Mentation and speech appropriate for age.\",\"PSYCH: Mentation appears normal, affect normal/bright, judgement and insight intact, normal speech and appearance well-groomed.    Assessment and plan:   Ms. Granados is a 60-year-old with chronic eosinophilic pneumonia with whom I had a video visit today.  Her chronic eosinophilic pneumonia is felt to be associated with radiation therapy for her breast cancer.  She has had no recurrences on her current regimen.  She overall is doing well.  I recommend that she continue to take prednisone 5 mg daily and montelukast, and she is in agreement with this plan.  She should get annual flu vaccine in the fall, and she does that regularly.  I encouraged her to " continue her physical activity as she is doing.  I also recommended that she continue to practice social distancing, avoid large groups, and wear a mask when she goes outside the house.  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  She would need to continue to socially distance and wear a mask at work or work from home if that is possible.  I will see her back in 1 year with full PFT.  She was call us in the meantime if she has recurrent symptoms that suggest a flare of her chronic eosinophilic pneumonia.  She will send us a form to fill out for return to work and we could also give her a letter if required by her employer.  In the past, her insurance company would not cover mepolizumab for her chronic eosinophilic Toño pneumonia, but that would be a potential treatment in the future as it targets eosinophils, if she requires further treatment.            Answers for HPI/ROS submitted by the patient on 6/19/2020   General Symptoms: No  Skin Symptoms: No  HENT Symptoms: No  EYE SYMPTOMS: No  HEART SYMPTOMS: No  LUNG SYMPTOMS: Yes  INTESTINAL SYMPTOMS: No  URINARY SYMPTOMS: No  GYNECOLOGIC SYMPTOMS: No  BREAST SYMPTOMS: No  SKELETAL SYMPTOMS: No  BLOOD SYMPTOMS: No  NERVOUS SYSTEM SYMPTOMS: No  MENTAL HEALTH SYMPTOMS: No  Cough: No  Sputum or phlegm: No  Coughing up blood: No  Difficulty breating or shortness of breath: Yes  Snoring: Yes  Wheezing: Yes  Difficulty breathing on exertion: No  Nighttime Cough: No  Difficulty breathing when lying flat: No      Again, thank you for allowing me to participate in the care of your patient.        Sincerely,        Paradise Ge MD

## 2020-08-22 DIAGNOSIS — J84.9 ILD (INTERSTITIAL LUNG DISEASE) (H): ICD-10-CM

## 2020-08-24 RX ORDER — PREDNISONE 5 MG/1
TABLET ORAL
Qty: 90 TABLET | Refills: 3 | Status: SHIPPED | OUTPATIENT
Start: 2020-08-24 | End: 2021-08-17

## 2020-09-01 ENCOUNTER — PATIENT OUTREACH (OUTPATIENT)
Dept: PULMONOLOGY | Facility: CLINIC | Age: 60
End: 2020-09-01

## 2020-09-01 NOTE — TELEPHONE ENCOUNTER
"Patient called to inform that she has been having new symptoms lately and simply wanted to have Dr. Ge provide insight around the recommendation that she was given after visiting with her Primary Care Provider on the matter.     Patient mentioned that the new symptoms she has developed in the past 6 months are concerning and is not sure if they are related to her lung condition. She reports having a couple of episodes in past 6 months (2 weeks ago most recent) where she describes experiencing \"laryngeal spasms\" that lasted up to a minute. She states she could not get air in or out and that it self resolved. One time she had just taken a sip of water while at work; describes that it felt like it went down the wrong way, sort of, but more severe discomfort and afterward her throat was sore for \"quite a while.\" The other time she was at home when a similar experience occurred.    Her PCP, who she recently had a video visit recommended a swallow study with ENT follow up. This will take place at [facility] on 9/9/20 and 9/10/20, respectively.    Meenakshi also mentions she was referred for a PMR consult for tissue pain and tightness around her chest that she thinks is likely related to the radiation therapy she has had in the past.     Message sent to Dr. Ge to update; informed patient that after Dr. Ge has a chance to review we would be in touch again.   "

## 2020-09-02 NOTE — TELEPHONE ENCOUNTER
She should follow up with the recs.  The throat issue sounds like it might not be related to lungs.    HK    Informed patient of provider response; appreciative of follow up.

## 2021-01-15 ENCOUNTER — HEALTH MAINTENANCE LETTER (OUTPATIENT)
Age: 61
End: 2021-01-15

## 2021-04-30 ENCOUNTER — VIRTUAL VISIT (OUTPATIENT)
Dept: PULMONOLOGY | Facility: CLINIC | Age: 61
End: 2021-04-30
Attending: INTERNAL MEDICINE
Payer: COMMERCIAL

## 2021-04-30 DIAGNOSIS — J84.9 ILD (INTERSTITIAL LUNG DISEASE) (H): ICD-10-CM

## 2021-04-30 PROCEDURE — 99214 OFFICE O/P EST MOD 30 MIN: CPT | Mod: 95 | Performed by: INTERNAL MEDICINE

## 2021-04-30 RX ORDER — MONTELUKAST SODIUM 10 MG/1
TABLET ORAL
Qty: 90 TABLET | Refills: 4 | Status: SHIPPED | OUTPATIENT
Start: 2021-04-30 | End: 2022-02-10

## 2021-04-30 NOTE — PROGRESS NOTES
"Meenakshi is a 61 year old who is being evaluated via a billable video visit.      How would you like to obtain your AVS? [employer]  If the video visit is dropped, the invitation should be resent by: Other e-mail: [email or web address]  Will anyone else be joining your video visit? No      Video Start Time: 9:31AM  Video-Visit Details    Type of service:  Video Visit    Video End Time: 9:50AM    Originating Location (pt. Location): Home  Distant Location (provider location):  [facility]UN[facility]     Platform used for Video Visit: BioSTL          [facility] Disease [facility]    Reason for Visit  Meenakshi Mckeon is a 61 year old year old female who is being seen for Interstitial Lung Disease (ILD) (Follow up )    HPI  Ms. Granados is a 61-year-old with chronic eosinophilic pneumonia with whom I had a video visit today.  Her chronic eosinophilic pneumonia is felt to be associated with radiation therapy for her breast cancer.  There are case reports of this that have been published.  She was having recurrent flares when she first came to see me in clinic.  She has been treated with low-dose prednisone and montelukast, and I added the montelukast as a steroid sparing agent which has been described for chronic eosinophilic pneumonia.  She has done well with this combination.    Today, she reports she is \"feeling good.\"  She denies shortness of breath, cough, fevers, chest pain, or skin rashes.  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  She exercises regularly by walking, yoga, and gardening.  She continues to take montelukast 10 mg daily and prednisone 5 mg daily.  She has not had a flare of her chronic eosinophilic pneumonia for many years on this regimen.    She did receive the Covid-19 Pfizer vaccine and is fully protected.      Current Outpatient Medications   Medication     montelukast " (SINGULAIR) 10 MG tablet     Multiple Vitamins-Minerals (MULTIVITAMIN ADULT PO)     Omega-3 Fatty Acids (OMEGA-3 FISH OIL PO)     omeprazole 20 MG tablet     predniSONE (DELTASONE) 5 MG tablet     VITAMIN D, CHOLECALCIFEROL, PO     Zolpidem Tartrate (AMBIEN PO)     albuterol (PROAIR HFA) 108 (90 BASE) MCG/ACT Inhaler     Glucos-Chondroit-Boron-C-Mn ([employer] GLUCOS-CHONDROIT TRIPLE ST PO)     IBUPROFEN PO     Probiotic Product (PROBIOTIC PO)     RaNITidine HCl (ZANTAC PO)     No current facility-administered medications for this visit.      Allergies   Allergen Reactions     Kenalog [Triamcinolone] Swelling     Past Medical History:   Diagnosis Date     Atrial fibrillation (H) 10/2014     Cancer (H)     Breast DCIS dx 2013. Lumpectomy and radiation completed 1/2014     Gastro-oesophageal reflux disease      ILD (interstitial lung disease) (H)     chronic eosinophilic pneumonia associated with radiation therapy, CT-guided needle bx 2014 of LLL consolidation showed organizing eosinophlic pneumonia (reviewed by Soha at [facility]). Started prednisone 4/2014.  BAL 6/2014 showed no eos (?if she was on pred at the timeL; several flares as pred has been tapered     Lung nodule     7 mm RLL nodule first noted 2016     Right heart enlargement 2/8/2017       Past Surgical History:   Procedure Laterality Date     cubocalneal fusion       KNEE SURGERY       LUMPECTOMY BREAST Left 2013       Social History     Socioeconomic History     Marital status:      Spouse name: Not on file     Number of children: Not on file     Years of education: Not on file     Highest education level: Not on file   Occupational History     Not on file   Social Needs     Financial resource strain: Not on file     Food insecurity     Worry: Not on file     Inability: Not on file     Transportation needs     Medical: Not on file     Non-medical: Not on file   Tobacco Use     Smoking status: Former Smoker     Packs/day: 0.50     Years:  "20.00     Pack years: 10.00     Types: Cigarettes     Quit date: 2000     Years since quittin.8     Smokeless tobacco: Never Used   Substance and Sexual Activity     Alcohol use: Yes     Alcohol/week: 0.0 standard drinks     Drug use: No     Sexual activity: Not on file   Lifestyle     Physical activity     Days per week: Not on file     Minutes per session: Not on file     Stress: Not on file   Relationships     Social connections     Talks on phone: Not on file     Gets together: Not on file     Attends Advent service: Not on file     Active member of club or organization: Not on file     Attends meetings of clubs or organizations: Not on file     Relationship status: Not on file     Intimate partner violence     Fear of current or ex partner: Not on file     Emotionally abused: Not on file     Physically abused: Not on file     Forced sexual activity: Not on file   Other Topics Concern     Parent/sibling w/ CABG, MI or angioplasty before 65F 55M? Not Asked   Social History Narrative    No exposure to asbestos, hot tubs, birds, mold, silica.  Hobbies: gardening, biking.  Nurse at [employer].  .       Family History   Problem Relation Age of Onset     Cerebrovascular Disease Mother      Hypertension Mother      Chronic Obstructive Pulmonary Disease Mother      C.A.D. Maternal Grandfather         both grfa with MI             Vitals: There were no vitals taken for this visit.    Exam:   GENERAL: Healthy, alert and no distress\",\"EYES: Eyes grossly normal to inspection.  No discharge or erythema, or obvious scleral/conjunctival abnormalities.\",\"RESP: No audible wheeze, cough, or visible cyanosis.  No visible retractions or increased work of breathing.  \",\"SKIN: Visible skin clear. No significant rash, abnormal pigmentation or lesions.\",\"NEURO: Cranial nerves grossly intact.  Mentation and speech appropriate for age.\",\"PSYCH: Mentation appears normal, affect normal/bright, judgement and " insight intact, normal speech and appearance well-groomed.      Assessment and plan:  Ms. Granados is a 61-year-old with chronic eosinophilic pneumonia with whom I had a video visit today.     1.  Chronic eosinophilic pneumonia.  She is doing well with no symptoms.  There is no PFT for me to review today as it is a video visit.  We will continue prednisone 5 mg daily and montelukast 10 mg daily.  I have sent a new prescription for the montelukast.  She will contact us when her prednisone prescription needs to be renewed.  I have encouraged her to continue regular exercise as she is doing.  I also recommended that she continue to practice social distancing and wear a facemask around unvaccinated people.  She should receive a flu vaccine in the fall.  I will see her back in 1 year with full PFT.

## 2021-04-30 NOTE — LETTER
"    4/30/2021         RE: Meenakshi Mckeon  [address] [id number]        Dear Colleague,    Thank you for referring your patient, Meenakshi Mckeon, to the [facility]. Please see a copy of my visit note below.    Meenakshi is a 61 year old who is being evaluated via a billable video visit.      How would you like to obtain your AVS? [employer]  If the video visit is dropped, the invitation should be resent by: Other e-mail: [employer]  Will anyone else be joining your video visit? No      Video Start Time: 9:31AM  Video-Visit Details    Type of service:  Video Visit    Video End Time: 9:50AM    Originating Location (pt. Location): Home  Distant Location (provider location):  [facility]     Platform used for Video Visit: [facility] Interstitial Lung Disease [facility]    Reason for Visit  Meenakshi Mckeon is a 61 year old year old female who is being seen for Interstitial Lung Disease (ILD) (Follow up )    HPI  Ms. Granados is a 61-year-old with chronic eosinophilic pneumonia with whom I had a video visit today.  Her chronic eosinophilic pneumonia is felt to be associated with radiation therapy for her breast cancer.  There are case reports of this that have been published.  She was having recurrent flares when she first came to see me in clinic.  She has been treated with low-dose prednisone and montelukast, and I added the montelukast as a steroid sparing agent which has been described for chronic eosinophilic pneumonia.  She has done well with this combination.    Today, she reports she is \"feeling good.\"  She denies shortness of breath, cough, fevers, chest pain, or skin rashes.  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  She exercises regularly by walking, yoga, and gardening.  She continues to take " montelukast 10 mg daily and prednisone 5 mg daily.  She has not had a flare of her chronic eosinophilic pneumonia for many years on this regimen.    She did receive the Covid-19 Pfizer vaccine and is fully protected.      Current Outpatient Medications   Medication     montelukast (SINGULAIR) 10 MG tablet     Multiple Vitamins-Minerals (MULTIVITAMIN ADULT PO)     Omega-3 Fatty Acids (OMEGA-3 FISH OIL PO)     omeprazole 20 MG tablet     predniSONE (DELTASONE) 5 MG tablet     VITAMIN D, CHOLECALCIFEROL, PO     Zolpidem Tartrate (AMBIEN PO)     albuterol (PROAIR HFA) 108 (90 BASE) MCG/ACT Inhaler     Glucos-Chondroit-Boron-C-Mn ([employer] GLUCOS-CHONDROIT TRIPLE ST PO)     IBUPROFEN PO     Probiotic Product (PROBIOTIC PO)     RaNITidine HCl (ZANTAC PO)     No current facility-administered medications for this visit.      Allergies   Allergen Reactions     Kenalog [Triamcinolone] Swelling     Past Medical History:   Diagnosis Date     Atrial fibrillation (H) 10/2014     Cancer (H)     Breast DCIS dx 2013. Lumpectomy and radiation completed 1/2014     Gastro-oesophageal reflux disease      ILD (interstitial lung disease) (H)     chronic eosinophilic pneumonia associated with radiation therapy, CT-guided needle bx 2014 of LLL consolidation showed organizing eosinophlic pneumonia (reviewed by Ethel and Cindy at [facility]). Started prednisone 4/2014.  BAL 6/2014 showed no eos (?if she was on pred at the timeL; several flares as pred has been tapered     Lung nodule     7 mm RLL nodule first noted 2016     Right heart enlargement 2/8/2017       Past Surgical History:   Procedure Laterality Date     cubocalneal fusion       KNEE SURGERY       LUMPECTOMY BREAST Left 2013       Social History     Socioeconomic History     Marital status:      Spouse name: Not on file     Number of children: Not on file     Years of education: Not on file     Highest education level: Not on file   Occupational History     Not on file  "  Social Needs     Financial resource strain: Not on file     Food insecurity     Worry: Not on file     Inability: Not on file     Transportation needs     Medical: Not on file     Non-medical: Not on file   Tobacco Use     Smoking status: Former Smoker     Packs/day: 0.50     Years: 20.00     Pack years: 10.00     Types: Cigarettes     Quit date: 2000     Years since quittin.8     Smokeless tobacco: Never Used   Substance and Sexual Activity     Alcohol use: Yes     Alcohol/week: 0.0 standard drinks     Drug use: No     Sexual activity: Not on file   Lifestyle     Physical activity     Days per week: Not on file     Minutes per session: Not on file     Stress: Not on file   Relationships     Social connections     Talks on phone: Not on file     Gets together: Not on file     Attends Muslim service: Not on file     Active member of club or organization: Not on file     Attends meetings of clubs or organizations: Not on file     Relationship status: Not on file     Intimate partner violence     Fear of current or ex partner: Not on file     Emotionally abused: Not on file     Physically abused: Not on file     Forced sexual activity: Not on file   Other Topics Concern     Parent/sibling w/ CABG, MI or angioplasty before 65F 55M? Not Asked   Social History Narrative    No exposure to asbestos, hot tubs, birds, mold, silica.  Hobbies: gardening, biking.  Nurse at [employer].  .       Family History   Problem Relation Age of Onset     Cerebrovascular Disease Mother      Hypertension Mother      Chronic Obstructive Pulmonary Disease Mother      C.A.D. Maternal Grandfather         both grfa with MI             Vitals: There were no vitals taken for this visit.    Exam:   GENERAL: Healthy, alert and no distress\",\"EYES: Eyes grossly normal to inspection.  No discharge or erythema, or obvious scleral/conjunctival abnormalities.\",\"RESP: No audible wheeze, cough, or visible cyanosis.  No visible " "retractions or increased work of breathing.  \",\"SKIN: Visible skin clear. No significant rash, abnormal pigmentation or lesions.\",\"NEURO: Cranial nerves grossly intact.  Mentation and speech appropriate for age.\",\"PSYCH: Mentation appears normal, affect normal/bright, judgement and insight intact, normal speech and appearance well-groomed.      Assessment and plan:  Ms. Granados is a 61-year-old with chronic eosinophilic pneumonia with whom I had a video visit today.     1.  Chronic eosinophilic pneumonia.  She is doing well with no symptoms.  There is no PFT for me to review today as it is a video visit.  We will continue prednisone 5 mg daily and montelukast 10 mg daily.  I have sent a new prescription for the montelukast.  She will contact us when her prednisone prescription needs to be renewed.  I have encouraged her to continue regular exercise as she is doing.  I also recommended that she continue to practice social distancing and wear a facemask around unvaccinated people.  She should receive a flu vaccine in the fall.  I will see her back in 1 year with full PFT.    Again, thank you for allowing me to participate in the care of your patient.        Sincerely,        Paradise Ge MD    "

## 2021-05-15 ENCOUNTER — HEALTH MAINTENANCE LETTER (OUTPATIENT)
Age: 61
End: 2021-05-15

## 2021-08-17 DIAGNOSIS — J84.9 ILD (INTERSTITIAL LUNG DISEASE) (H): ICD-10-CM

## 2021-08-17 RX ORDER — PREDNISONE 5 MG/1
5 TABLET ORAL DAILY
Qty: 90 TABLET | Refills: 1 | Status: SHIPPED | OUTPATIENT
Start: 2021-08-17 | End: 2022-02-10

## 2021-09-04 ENCOUNTER — HEALTH MAINTENANCE LETTER (OUTPATIENT)
Age: 61
End: 2021-09-04

## 2022-02-04 ASSESSMENT — ENCOUNTER SYMPTOMS
DYSPNEA ON EXERTION: 0
MUSCLE WEAKNESS: 0
COUGH DISTURBING SLEEP: 1
SNORES LOUDLY: 0
COUGH: 1
JOINT SWELLING: 0
STIFFNESS: 1
ARTHRALGIAS: 1
MUSCLE CRAMPS: 0
POSTURAL DYSPNEA: 0
HEMOPTYSIS: 0
BACK PAIN: 0
SPUTUM PRODUCTION: 0
NECK PAIN: 0
MYALGIAS: 1
SHORTNESS OF BREATH: 0
WHEEZING: 0

## 2022-02-10 ENCOUNTER — OFFICE VISIT (OUTPATIENT)
Dept: PULMONOLOGY | Facility: CLINIC | Age: 62
End: 2022-02-10
Attending: INTERNAL MEDICINE
Payer: COMMERCIAL

## 2022-02-10 VITALS — OXYGEN SATURATION: 98 % | SYSTOLIC BLOOD PRESSURE: 119 MMHG | DIASTOLIC BLOOD PRESSURE: 78 MMHG | HEART RATE: 77 BPM

## 2022-02-10 DIAGNOSIS — J84.9 ILD (INTERSTITIAL LUNG DISEASE) (H): ICD-10-CM

## 2022-02-10 DIAGNOSIS — J84.9 ILD (INTERSTITIAL LUNG DISEASE) (H): Primary | ICD-10-CM

## 2022-02-10 PROCEDURE — 99214 OFFICE O/P EST MOD 30 MIN: CPT | Mod: 25 | Performed by: INTERNAL MEDICINE

## 2022-02-10 PROCEDURE — 94726 PLETHYSMOGRAPHY LUNG VOLUMES: CPT | Performed by: INTERNAL MEDICINE

## 2022-02-10 PROCEDURE — 94375 RESPIRATORY FLOW VOLUME LOOP: CPT | Performed by: INTERNAL MEDICINE

## 2022-02-10 PROCEDURE — 94729 DIFFUSING CAPACITY: CPT | Performed by: INTERNAL MEDICINE

## 2022-02-10 PROCEDURE — G0463 HOSPITAL OUTPT CLINIC VISIT: HCPCS | Mod: 25

## 2022-02-10 RX ORDER — SULFAMETHOXAZOLE AND TRIMETHOPRIM 400; 80 MG/1; MG/1
1 TABLET ORAL
COMMUNITY
Start: 2021-11-17 | End: 2024-02-09

## 2022-02-10 RX ORDER — VENLAFAXINE HYDROCHLORIDE 37.5 MG/1
37.5 CAPSULE, EXTENDED RELEASE ORAL
COMMUNITY
Start: 2021-05-28 | End: 2022-05-28

## 2022-02-10 RX ORDER — MONTELUKAST SODIUM 10 MG/1
TABLET ORAL
Qty: 90 TABLET | Refills: 4 | Status: SHIPPED | OUTPATIENT
Start: 2022-02-10 | End: 2023-05-19

## 2022-02-10 RX ORDER — SODIUM SULFACETAMIDE AND SULFUR 100; 50 MG/G; MG/G
CREAM TOPICAL
COMMUNITY
Start: 2020-12-30

## 2022-02-10 RX ORDER — LOSARTAN POTASSIUM 50 MG/1
50 TABLET ORAL
COMMUNITY
Start: 2021-11-11 | End: 2022-12-01

## 2022-02-10 RX ORDER — PREDNISONE 5 MG/1
5 TABLET ORAL DAILY
Qty: 90 TABLET | Refills: 1 | Status: SHIPPED | OUTPATIENT
Start: 2022-02-10 | End: 2022-08-19

## 2022-02-10 RX ORDER — ESTRADIOL 0.1 MG/G
CREAM VAGINAL
COMMUNITY
Start: 2021-11-11

## 2022-02-10 NOTE — NURSING NOTE
Chief Complaint   Patient presents with     Interstitial Lung Disease (ILD)     follow up     Vitals were taken and medications were reconciled.     JAVIER Cervantes

## 2022-02-10 NOTE — PROGRESS NOTES
"  [facility] Interstitial Lung Disease Clinic    Reason for Visit  Meenakshi Mckeon is a 61 year old year old female who is being seen for Interstitial Lung Disease (ILD) (follow up)    HPI  Ms. 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  Her chronic eosinophilic pneumonia is felt to be associated with radiation therapy for her breast cancer.  There are case reports of this that have been published.  She was having recurrent flares when she first came to see me in clinic.  She has been treated with low-dose prednisone and montelukast, and I added the montelukast as a steroid sparing agent which has been described for chronic eosinophilic pneumonia.  She has done well with this combination.    Today, she states she is feeling well. She stated her shortness of breath is at her baseline and she continues to use the stationary bike, treadmill, and do yoga for exercise without issues. She recently walked around [facility]. She states she was experiencing a \"tickle in my throat\" 3 months ago, but changing the timing of montelukast from morning to evening has resolved this cough for the past month. She continues to tolerate 10 mg of montelukast and 5 mg of prednisone well.     Outside of her respiratory issues, she has had changes in her medical conditions as follows:  -newly diagnosed hypertension  -newly diagnosed hyperparathyroidism  -newly diagnosed osteoporosis     These are all being managed by endocrinology and her primary care physician.     She did receive the Covid-19 Pfizer vaccine and is fully protected.      Current Outpatient Medications   Medication     estradiol (ESTRACE) 0.1 MG/GM vaginal cream     losartan (COZAAR) 50 MG tablet     montelukast (SINGULAIR) 10 MG tablet     Multiple Vitamins-Minerals (MULTIVITAMIN ADULT PO)     omeprazole 20 MG tablet     predniSONE (DELTASONE) 5 MG tablet     Sulfacetamide Sodium-Sulfur 10-5 % CREA     sulfamethoxazole-trimethoprim " (BACTRIM) 400-80 MG tablet     venlafaxine (EFFEXOR-XR) 37.5 MG 24 hr capsule     VITAMIN D, CHOLECALCIFEROL, PO     Zolpidem Tartrate (AMBIEN PO)     No current facility-administered medications for this visit.     Allergies   Allergen Reactions     Kenalog [Triamcinolone] Swelling     Past Medical History:   Diagnosis Date     Atrial fibrillation (H) 10/2014     Cancer (H)     Breast DCIS dx . Lumpectomy and radiation completed 2014     Gastro-oesophageal reflux disease      ILD (interstitial lung disease) (H)     chronic eosinophilic pneumonia associated with radiation therapy, CT-guided needle bx  of LLL consolidation showed organizing eosinophlic pneumonia (reviewed by Soha at [facility]). Started prednisone 2014.  BAL 2014 showed no eos (?if she was on pred at the timeL; several flares as pred has been tapered     Lung nodule     7 mm RLL nodule first noted      Right heart enlargement 2017       Past Surgical History:   Procedure Laterality Date     cubocalneal fusion       KNEE SURGERY       LUMPECTOMY BREAST Left        Social History     Socioeconomic History     Marital status:      Spouse name: Not on file     Number of children: Not on file     Years of education: Not on file     Highest education level: Not on file   Occupational History     Not on file   Tobacco Use     Smoking status: Former Smoker     Packs/day: 0.50     Years: 20.00     Pack years: 10.00     Types: Cigarettes     Quit date: 2000     Years since quittin.6     Smokeless tobacco: Never Used   Substance and Sexual Activity     Alcohol use: Yes     Alcohol/week: 0.0 standard drinks     Drug use: No     Sexual activity: Not on file   Other Topics Concern     Parent/sibling w/ CABG, MI or angioplasty before 65F 55M? Not Asked   Social History Narrative    No exposure to asbestos, hot tubs, birds, mold, silica.  Hobbies: gardening, biking.  Nurse at [employer].  .  "    Social Determinants of Health     Financial Resource Strain: Not on file   Food Insecurity: Not on file   Transportation Needs: Not on file   Physical Activity: Not on file   Stress: Not on file   Social Connections: Not on file   Intimate Partner Violence: Not on file   Housing Stability: Not on file       Family History   Problem Relation Age of Onset     Cerebrovascular Disease Mother      Hypertension Mother      Chronic Obstructive Pulmonary Disease Mother      C.A.D. Maternal Grandfather         both grfa with MI         Vitals: /78   Pulse 77   SpO2 98%     Exam:   GENERAL: Healthy, alert and no distress\",\"EYES: Eyes grossly normal to inspection.  No discharge or erythema, or obvious scleral/conjunctival abnormalities.\",\"RESP: No audible wheeze, cough, or visible cyanosis.  No visible retractions or increased work of breathing.  \",\"SKIN: Visible skin clear. No significant rash, abnormal pigmentation or lesions.\",\"NEURO: Cranial nerves grossly intact.  Mentation and speech appropriate for age.\",\"PSYCH: Mentation appears normal, affect normal/bright, judgement and insight intact, normal speech and appearance well-groomed.      Assessment and plan:  Ms. Mcekon is a 61-year-old with chronic eosinophilic pneumonia  who is here for follow-up.    1.  Chronic eosinophilic pneumonia.  She is doing well with no symptoms.  PFT obtained today is within normal limits. We will continue prednisone 5 mg daily and montelukast 10 mg daily.  I have sent new prescriptions for prednisone and montelukast. I have encouraged her to continue regular exercise as she is doing. I will see her back in 1 year.    Saul Loja  PGY3 resident  Pulmonology     I saw and evaluated patient with Fellow.  Case discussed - agree with note.  I reviewed pulmonary function test that was performed today.  This shows normal spirometry, total lung capacity and diffusing capacity.  Lung function is stable.  I sent new prescriptions for " prednisone and montelukast.  I asked her to call us if she develops new symptoms to suggest that her chronic eosinophilic pneumonia is flaring again.  Otherwise, she will return in 1 year.    I spent 34 minutes reviewing chart, reviewing test results, talking with and examining patient, formulating plan, and documentation on the day of the encounter.      DOMO CORBETT M.D.

## 2022-02-10 NOTE — LETTER
"  2/10/2022     RE: Meenakshi Mckeon  [address] [id number]    Dear Colleague,    Thank you for referring your patient, Meenakshi Mckeon, to the [facility] FOR LUNG SCIENCE AND [facility] CLINIC [place]. Please see a copy of my visit note below.    [facility] Interstitial Lung Disease Clinic    Reason for Visit  Meenakshi Mckeon is a 61 year old year old female who is being seen for Interstitial Lung Disease (ILD) (follow up)    HPI  Ms. 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  Her chronic eosinophilic pneumonia is felt to be associated with radiation therapy for her breast cancer.  There are case reports of this that have been published.  She was having recurrent flares when she first came to see me in clinic.  She has been treated with low-dose prednisone and montelukast, and I added the montelukast as a steroid sparing agent which has been described for chronic eosinophilic pneumonia.  She has done well with this combination.    Today, she states she is feeling well. She stated her shortness of breath is at her baseline and she continues to use the stationary bike, treadmill, and do yoga for exercise without issues. She recently walked around [facility]. She states she was experiencing a \"tickle in my throat\" 3 months ago, but changing the timing of montelukast from morning to evening has resolved this cough for the past month. She continues to tolerate 10 mg of montelukast and 5 mg of prednisone well.     Outside of her respiratory issues, she has had changes in her medical conditions as follows:  -newly diagnosed hypertension  -newly diagnosed hyperparathyroidism  -newly diagnosed osteoporosis     These are all being managed by endocrinology and her primary care physician.     She did receive the Covid-19 Pfizer vaccine and is fully protected.      Current Outpatient Medications   Medication     estradiol (ESTRACE) 0.1 MG/GM vaginal cream     " losartan (COZAAR) 50 MG tablet     montelukast (SINGULAIR) 10 MG tablet     Multiple Vitamins-Minerals (MULTIVITAMIN ADULT PO)     omeprazole 20 MG tablet     predniSONE (DELTASONE) 5 MG tablet     Sulfacetamide Sodium-Sulfur 10-5 % CREA     sulfamethoxazole-trimethoprim (BACTRIM) 400-80 MG tablet     venlafaxine (EFFEXOR-XR) 37.5 MG 24 hr capsule     VITAMIN D, CHOLECALCIFEROL, PO     Zolpidem Tartrate (AMBIEN PO)     No current facility-administered medications for this visit.     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 Started prednisone 2014.  BAL 2014 showed no eos (?if she was on pred at the timeL; several flares as pred has been tapered     Lung nodule     7 mm RLL nodule first noted      Right heart enlargement 2017       Past Surgical History:   Procedure Laterality Date     cubocalneal fusion       KNEE SURGERY       LUMPECTOMY BREAST Left        Social History     Socioeconomic History     Marital status:      Spouse name: Not on file     Number of children: Not on file     Years of education: Not on file     Highest education level: Not on file   Occupational History     Not on file   Tobacco Use     Smoking status: Former Smoker     Packs/day: 0.50     Years: 20.00     Pack years: 10.00     Types: Cigarettes     Quit date: 2000     Years since quittin.6     Smokeless tobacco: Never Used   Substance and Sexual Activity     Alcohol use: Yes     Alcohol/week: 0.0 standard drinks     Drug use: No     Sexual  "activity: Not on file   Other Topics Concern     Parent/sibling w/ CABG, MI or angioplasty before 65F 55M? Not Asked   Social History Narrative    No exposure to asbestos, hot tubs, birds, mold, silica.  Hobbies: gardening, biking.  Nurse at [employer].  .     Social Determinants of Health     Financial Resource Strain: Not on file   Food Insecurity: Not on file   Transportation Needs: Not on file   Physical Activity: Not on file   Stress: Not on file   Social Connections: Not on file   Intimate Partner Violence: Not on file   Housing Stability: Not on file       Family History   Problem Relation Age of Onset     Cerebrovascular Disease Mother      Hypertension Mother      Chronic Obstructive Pulmonary Disease Mother      C.A.D. Maternal Grandfather         both grfa with MI         Vitals: /78   Pulse 77   SpO2 98%     Exam:   GENERAL: Healthy, alert and no distress\",\"EYES: Eyes grossly normal to inspection.  No discharge or erythema, or obvious scleral/conjunctival abnormalities.\",\"RESP: No audible wheeze, cough, or visible cyanosis.  No visible retractions or increased work of breathing.  \",\"SKIN: Visible skin clear. No significant rash, abnormal pigmentation or lesions.\",\"NEURO: Cranial nerves grossly intact.  Mentation and speech appropriate for age.\",\"PSYCH: Mentation appears normal, affect normal/bright, judgement and insight intact, normal speech and appearance well-groomed.      Assessment and plan:  Ms. Mckeon is a 61-year-old with chronic eosinophilic pneumonia  who is here for follow-up.    1.  Chronic eosinophilic pneumonia.  She is doing well with no symptoms.  PFT obtained today is within normal limits. We will continue prednisone 5 mg daily and montelukast 10 mg daily.  I have sent new prescriptions for prednisone and montelukast. I have encouraged her to continue regular exercise as she is doing. I will see her back in 1 year.    Saul Loja  PGY3 resident  Pulmonology "     I saw and evaluated patient with Fellow.  Case discussed - agree with note.  I reviewed pulmonary function test that was performed today.  This shows normal spirometry, total lung capacity and diffusing capacity.  Lung function is stable.  I sent new prescriptions for prednisone and montelukast.  I asked her to call us if she develops new symptoms to suggest that her chronic eosinophilic pneumonia is flaring again.  Otherwise, she will return in 1 year.    I spent 34 minutes reviewing chart, reviewing test results, talking with and examining patient, formulating plan, and documentation on the day of the encounter.      DOMO CORBETT M.D.

## 2022-02-11 LAB
DLCOUNC-%PRED-PRE: 105 %
DLCOUNC-PRE: 21.22 ML/MIN/MMHG
DLCOUNC-PRED: 20.15 ML/MIN/MMHG
ERV-%PRED-PRE: 1 %
ERV-PRE: 0.01 L
ERV-PRED: 0.7 L
EXPTIME-PRE: 5.95 SEC
FEF2575-%PRED-PRE: 122 %
FEF2575-PRE: 2.72 L/SEC
FEF2575-PRED: 2.23 L/SEC
FEFMAX-%PRED-PRE: 104 %
FEFMAX-PRE: 6.53 L/SEC
FEFMAX-PRED: 6.24 L/SEC
FEV1-%PRED-PRE: 101 %
FEV1-PRE: 2.49 L
FEV1FEV6-PRE: 82 %
FEV1FEV6-PRED: 81 %
FEV1FVC-PRE: 82 %
FEV1FVC-PRED: 79 %
FEV1SVC-PRE: 80 %
FEV1SVC-PRED: 76 %
FIFMAX-PRE: 4.07 L/SEC
FRCPLETH-%PRED-PRE: 76 %
FRCPLETH-PRE: 2.06 L
FRCPLETH-PRED: 2.7 L
FVC-%PRED-PRE: 97 %
FVC-PRE: 3.06 L
FVC-PRED: 3.14 L
IC-%PRED-PRE: 122 %
IC-PRE: 3.13 L
IC-PRED: 2.55 L
RVPLETH-%PRED-PRE: 106 %
RVPLETH-PRE: 2.05 L
RVPLETH-PRED: 1.92 L
TLCPLETH-%PRED-PRE: 104 %
TLCPLETH-PRE: 5.18 L
TLCPLETH-PRED: 4.94 L
VA-%PRED-PRE: 99 %
VA-PRE: 4.77 L
VC-%PRED-PRE: 96 %
VC-PRE: 3.14 L
VC-PRED: 3.25 L

## 2022-02-19 ENCOUNTER — HEALTH MAINTENANCE LETTER (OUTPATIENT)
Age: 62
End: 2022-02-19

## 2022-05-23 NOTE — PROGRESS NOTES
Telephone Encounter: Incoming    Reason for Call: Patient has had a post viral cough for the past week and a half.  States that she is coughing to the point where she has to sleep sitting up.  Also, states she has had some wheezing. She took a z-ngozi that she had on hand last week when she noticed that her sputum was greenish. Currently denies fevers and sputum production.      Nursing Action/Patient Instruction: Discussed with Dr. Ge who would like patient to increase prednisone to 20 mg for one week, 10 mg for one week then back to baseline of 6 mg. Will also add an albuterol inhaler to be used prn for wheezing.    Patient Response/Questions/Concerns: Patient is in agreement with plan and verbalizes understanding.   none

## 2022-06-09 ENCOUNTER — TELEPHONE (OUTPATIENT)
Dept: PULMONOLOGY | Facility: CLINIC | Age: 62
End: 2022-06-09
Payer: COMMERCIAL

## 2022-06-09 NOTE — TELEPHONE ENCOUNTER
Could be allergies or postnasal drip. Try OTC zyrtec regularly and also flonase 1 spray each nostril twice daily.  Do this for at least 4 weeks.

## 2022-06-09 NOTE — TELEPHONE ENCOUNTER
RN spoke with patient and reviewed. Pt requested also be sent via [employer] ([employer]) ([employer]). Pt will reach out of sx do not resolve.   Odilia Dean RN BSN

## 2022-06-09 NOTE — TELEPHONE ENCOUNTER
Pt is experiencing non productive cough mainly at night for about a month. Reports it feels more like a tickle at back of throat but causing her to wake from sleep and sit up in coughing fit. Also has Hoarse voice. Denies fever or chills.  She was unsure if allergies or something else. Has taken OTCs like Zyrtec but did not really help cough. She continues on Prednisone 5mg and Singular 10mg daily. Pt looking for advice.   Odilia Dean, RN BSN

## 2022-08-19 DIAGNOSIS — J84.9 ILD (INTERSTITIAL LUNG DISEASE) (H): ICD-10-CM

## 2022-08-19 RX ORDER — PREDNISONE 5 MG/1
5 TABLET ORAL DAILY
Qty: 90 TABLET | Refills: 1 | Status: SHIPPED | OUTPATIENT
Start: 2022-08-19 | End: 2023-02-08

## 2022-10-22 ENCOUNTER — HEALTH MAINTENANCE LETTER (OUTPATIENT)
Age: 62
End: 2022-10-22

## 2023-02-07 DIAGNOSIS — J84.9 ILD (INTERSTITIAL LUNG DISEASE) (H): ICD-10-CM

## 2023-02-08 RX ORDER — PREDNISONE 5 MG/1
5 TABLET ORAL DAILY
Qty: 90 TABLET | Refills: 0 | Status: SHIPPED | OUTPATIENT
Start: 2023-02-08 | End: 2023-05-19

## 2023-02-10 ENCOUNTER — OFFICE VISIT (OUTPATIENT)
Dept: PULMONOLOGY | Facility: CLINIC | Age: 63
End: 2023-02-10
Attending: INTERNAL MEDICINE
Payer: COMMERCIAL

## 2023-02-10 VITALS — OXYGEN SATURATION: 97 % | SYSTOLIC BLOOD PRESSURE: 145 MMHG | HEART RATE: 70 BPM | DIASTOLIC BLOOD PRESSURE: 78 MMHG

## 2023-02-10 DIAGNOSIS — J84.9 ILD (INTERSTITIAL LUNG DISEASE) (H): Primary | ICD-10-CM

## 2023-02-10 PROCEDURE — 94729 DIFFUSING CAPACITY: CPT | Performed by: INTERNAL MEDICINE

## 2023-02-10 PROCEDURE — 94375 RESPIRATORY FLOW VOLUME LOOP: CPT | Performed by: INTERNAL MEDICINE

## 2023-02-10 PROCEDURE — 99214 OFFICE O/P EST MOD 30 MIN: CPT | Mod: 25 | Performed by: INTERNAL MEDICINE

## 2023-02-10 PROCEDURE — 94726 PLETHYSMOGRAPHY LUNG VOLUMES: CPT | Performed by: INTERNAL MEDICINE

## 2023-02-10 PROCEDURE — 99213 OFFICE O/P EST LOW 20 MIN: CPT | Performed by: INTERNAL MEDICINE

## 2023-02-10 ASSESSMENT — PAIN SCALES - GENERAL: PAINLEVEL: NO PAIN (0)

## 2023-02-10 NOTE — NURSING NOTE
Chief Complaint   Patient presents with     RECHECK     Return Interstitial Lung     Vitals were taken and medications were reconciled.     Isabel Wang RMA  9:24 AM

## 2023-02-10 NOTE — LETTER
2/10/2023         RE: Meenakshi Mckeon  [address] [address]        Dear Colleague,    Thank you for referring your patient, Meenakshi Mckeon, to the [facility] FOR LUNG SCIENCE AND HEALTH CLINIC [place]. Please see a copy of my visit note below.    [facility] Interstitial Lung Disease Clinic    Reason for Visit  Meenakshi Mckeon is a 62 year old year old female who is being seen for RECHECK (Return Interstitial Lung)    HPI  Ms. Mckeon is a 62-year-old with chronic eosinophilic pneumonia.  Her chronic eosinophilic pneumonia is felt to be associated with radiation therapy for her breast cancer.  There are case reports of this that have been published.  She was having recurrent flares when she first came to see me in clinic.  She has been treated with low-dose prednisone and montelukast, and I added the montelukast as a steroid sparing agent which has been described for chronic eosinophilic pneumonia.  She has done well with this combination.     Today, Ms. Mckeon reports that she had removal of a parathyroid adenoma in December.  She also has osteoporosis and is being treated with Reclast and calcium with vitamin D.  She continues to be active and exercises with the treadmill, bike, yoga and walking.  She denies dyspnea on exertion, cough or fevers.  She has no symptoms to suggest that her chronic eosinophilic pneumonia is relapsing.  She and her  plan to go to [place] on vacation soon.    She did receive the bivalent COVID 19 booster in September.          Current Outpatient Medications   Medication     estradiol (ESTRACE) 0.1 MG/GM vaginal cream     montelukast (SINGULAIR) 10 MG tablet     Multiple Vitamins-Minerals (MULTIVITAMIN ADULT PO)     omeprazole 20 MG tablet     predniSONE (DELTASONE) 5 MG tablet     Sulfacetamide Sodium-Sulfur 10-5 % CREA     sulfamethoxazole-trimethoprim (BACTRIM) 400-80 MG tablet     VITAMIN D, CHOLECALCIFEROL, PO     Zolpidem  Tartrate (AMBIEN PO)     losartan (COZAAR) 50 MG tablet     venlafaxine (EFFEXOR-XR) 37.5 MG 24 hr capsule     No current facility-administered medications for this visit.     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 Started prednisone 2014.  BAL 2014 showed no eos (?if she was on pred at the timeL; several flares as pred has been tapered     Lung nodule     7 mm RLL nodule first noted      Right heart enlargement 2017       Past Surgical History:   Procedure Laterality Date     cubocalneal fusion       KNEE SURGERY       LUMPECTOMY BREAST Left        Social History     Socioeconomic History     Marital status:      Spouse name: Not on file     Number of children: Not on file     Years of education: Not on file     Highest education level: Not on file   Occupational History     Not on file   Tobacco Use     Smoking status: Former     Packs/day: 0.50     Years: 20.00     Pack years: 10.00     Types: Cigarettes     Quit date: 2000     Years since quittin.6     Smokeless tobacco: Never   Substance and Sexual Activity     Alcohol use: Yes     Alcohol/week: 0.0 standard drinks     Drug use: No     Sexual activity: Not on file   Other Topics Concern     Parent/sibling w/ CABG, MI or angioplasty before 65F 55M? Not Asked   Social History Narrative    No exposure to asbestos, hot tubs, birds, mold, silica.  Hobbies: gardening, biking.  Nurse at [employer].  .     Social Determinants of Health     Financial Resource  Strain: Not on file   Food Insecurity: Not on file   Transportation Needs: Not on file   Physical Activity: Not on file   Stress: Not on file   Social Connections: Not on file   Intimate Partner Violence: Not on file   Housing Stability: Not on file       Family History   Problem Relation Age of Onset     Cerebrovascular Disease Mother      Hypertension Mother      Chronic Obstructive Pulmonary Disease Mother      C.A.D. Maternal Grandfather         both grfa with MI           Vitals: BP (!) 145/78 (BP Location: Right arm)   Pulse 70   SpO2 97%     Exam:   GENERAL APPEARANCE: Well developed, well nourished, alert, and in no apparent distress.  RESP: good air flow throughout.  No crackles. No rhonchi. No wheezes.  CV: Normal S1, S2, regular rhythm, normal rate. No murmur.  No LE edema.   MS: extremities normal. No clubbing. No cyanosis.  SKIN: no rash on limited exam.  NEURO: Mentation intact, speech normal, normal gait and stance.  PSYCH: mentation appears normal. and affect normal/bright.      Results:  Recent Results (from the past 168 hour(s))   General PFT Lab (Please always keep checked)    Collection Time: 02/10/23  8:57 AM   Result Value Ref Range    FVC-Pred 3.11 L    FVC-Pre 2.91 L    FVC-%Pred-Pre 93 %    FEV1-Pre 2.33 L    FEV1-%Pred-Pre 95 %    FEV1FVC-Pred 79 %    FEV1FVC-Pre 80 %    FEFMax-Pred 6.18 L/sec    FEFMax-Pre 7.28 L/sec    FEFMax-%Pred-Pre 117 %    FEF2575-Pred 2.19 L/sec    FEF2575-Pre 2.15 L/sec    AUJ2400-%Pred-Pre 98 %    ExpTime-Pre 7.40 sec    FIFMax-Pre 4.63 L/sec    VC-Pred 3.23 L    VC-Pre 3.28 L    VC-%Pred-Pre 101 %    IC-Pred 2.53 L    IC-Pre 3.07 L    IC-%Pred-Pre 121 %    ERV-Pred 0.70 L    ERV-Pre 0.21 L    ERV-%Pred-Pre 29 %    FEV1FEV6-Pred 80 %    FEV1FEV6-Pre 80 %    FRCPleth-Pred 2.70 L    FRCPleth-Pre 2.05 L    FRCPleth-%Pred-Pre 75 %    RVPleth-Pred 1.93 L    RVPleth-Pre 1.85 L    RVPleth-%Pred-Pre 95 %    TLCPleth-Pred 4.94 L    TLCPleth-Pre 5.12 L     TLCPleth-%Pred-Pre 103 %    DLCOunc-Pred 20.07 ml/min/mmHg    DLCOunc-Pre 21.12 ml/min/mmHg    DLCOunc-%Pred-Pre 105 %    VA-Pre 4.58 L    VA-%Pred-Pre 95 %    FEV1SVC-Pred 76 %    FEV1SVC-Pre 71 %       I reviewed pulmonary function test that was performed today.  This shows normal spirometry, lung volumes and diffusing capacity.  Lung function is stable.    I reviewed results with the patient.      Assessment and plan:  Ms. Mckeon is a 62-year-old with chronic eosinophilic pneumonia.  Her chronic eosinophilic pneumonia is felt to be associated with radiation therapy for her breast cancer.  There are case reports of this that have been published.   1.  Chronic eosinophilic pneumonia.  PFT is normal and stable.  She has no symptoms to suggest recurrence or flare of the chronic eosinophilic pneumonia.  In the past, she had multiple relapses when she was tapered off prednisone.  She has done well with low-dose prednisone 5 mg daily and montelukast 10 mg at nighttime.  We will continue this regimen.  We did have a discussion about risks and benefits of prednisone.  She does have osteoporosis, but it is being managed.  Overall, I think the benefits outweigh the risk at this time, and she is in agreement.  We can discuss this again in the future.  I did encourage her to continue being active as she is doing.  She will return in 1 year with full PFT.  We will likely repeat a CT in the next 1 to 2 years as a new baseline.  I spent 39 minutes reviewing chart, reviewing test results, talking with and examining patient, formulating plan, and documentation on the day of the encounter    Again, thank you for allowing me to participate in the care of your patient.        Sincerely,        Paradise Ge MD

## 2023-02-10 NOTE — PROGRESS NOTES
[facility] Interstitial Lung Disease Clinic    Reason for Visit  Meenakshi Mckeon is a 62 year old year old female who is being seen for RECHECK (Return Interstitial Lung)    HPI  Ms. Mckeon is a 62-year-old with chronic eosinophilic pneumonia.  Her chronic eosinophilic pneumonia is felt to be associated with radiation therapy for her breast cancer.  There are case reports of this that have been published.  She was having recurrent flares when she first came to see me in clinic.  She has been treated with low-dose prednisone and montelukast, and I added the montelukast as a steroid sparing agent which has been described for chronic eosinophilic pneumonia.  She has done well with this combination.     Today, Ms. Mckeon reports that she had removal of a parathyroid adenoma in December.  She also has osteoporosis and is being treated with Reclast and calcium with vitamin D.  She continues to be active and exercises with the treadmill, bike, yoga and walking.  She denies dyspnea on exertion, cough or fevers.  She has no symptoms to suggest that her chronic eosinophilic pneumonia is relapsing.  She and her  plan to go to [place] on vacation soon.    She did receive the bivalent COVID 19 booster in September.          Current Outpatient Medications   Medication     estradiol (ESTRACE) 0.1 MG/GM vaginal cream     montelukast (SINGULAIR) 10 MG tablet     Multiple Vitamins-Minerals (MULTIVITAMIN ADULT PO)     omeprazole 20 MG tablet     predniSONE (DELTASONE) 5 MG tablet     Sulfacetamide Sodium-Sulfur 10-5 % CREA     sulfamethoxazole-trimethoprim (BACTRIM) 400-80 MG tablet     VITAMIN D, CHOLECALCIFEROL, PO     Zolpidem Tartrate (AMBIEN PO)     losartan (COZAAR) 50 MG tablet     venlafaxine (EFFEXOR-XR) 37.5 MG 24 hr capsule     No current facility-administered medications for this visit.     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 Started prednisone 2014.  BAL 2014 showed no eos (?if she was on pred at the timeL; several flares as pred has been tapered     Lung nodule     7 mm RLL nodule first noted      Right heart enlargement 2017       Past Surgical History:   Procedure Laterality Date     cubocalneal fusion       KNEE SURGERY       LUMPECTOMY BREAST Left        Social History     Socioeconomic History     Marital status:      Spouse name: Not on file     Number of children: Not on file     Years of education: Not on file     Highest education level: Not on file   Occupational History     Not on file   Tobacco Use     Smoking status: Former     Packs/day: 0.50     Years: 20.00     Pack years: 10.00     Types: Cigarettes     Quit date: 2000     Years since quittin.6     Smokeless tobacco: Never   Substance and Sexual Activity     Alcohol use: Yes     Alcohol/week: 0.0 standard drinks     Drug use: No     Sexual activity: Not on file   Other Topics Concern     Parent/sibling w/ CABG, MI or angioplasty before 65F 55M? Not Asked   Social History Narrative    No exposure to asbestos, hot tubs, birds, mold, silica.  Hobbies: gardening, biking.  Nurse at [employer].  .     Social Determinants of Health     Financial Resource Strain: Not on file   Food Insecurity: Not on file   Transportation Needs: Not on file   Physical Activity: Not on file   Stress: Not on file   Social Connections: Not on file   Intimate Partner Violence: Not on file   Housing Stability: Not on file       Family History   Problem Relation  Age of Onset     Cerebrovascular Disease Mother      Hypertension Mother      Chronic Obstructive Pulmonary Disease Mother      C.A.D. Maternal Grandfather         both grfa with MI           Vitals: BP (!) 145/78 (BP Location: Right arm)   Pulse 70   SpO2 97%     Exam:   GENERAL APPEARANCE: Well developed, well nourished, alert, and in no apparent distress.  RESP: good air flow throughout.  No crackles. No rhonchi. No wheezes.  CV: Normal S1, S2, regular rhythm, normal rate. No murmur.  No LE edema.   MS: extremities normal. No clubbing. No cyanosis.  SKIN: no rash on limited exam.  NEURO: Mentation intact, speech normal, normal gait and stance.  PSYCH: mentation appears normal. and affect normal/bright.      Results:  Recent Results (from the past 168 hour(s))   General PFT Lab (Please always keep checked)    Collection Time: 02/10/23  8:57 AM   Result Value Ref Range    FVC-Pred 3.11 L    FVC-Pre 2.91 L    FVC-%Pred-Pre 93 %    FEV1-Pre 2.33 L    FEV1-%Pred-Pre 95 %    FEV1FVC-Pred 79 %    FEV1FVC-Pre 80 %    FEFMax-Pred 6.18 L/sec    FEFMax-Pre 7.28 L/sec    FEFMax-%Pred-Pre 117 %    FEF2575-Pred 2.19 L/sec    FEF2575-Pre 2.15 L/sec    FHP7827-%Pred-Pre 98 %    ExpTime-Pre 7.40 sec    FIFMax-Pre 4.63 L/sec    VC-Pred 3.23 L    VC-Pre 3.28 L    VC-%Pred-Pre 101 %    IC-Pred 2.53 L    IC-Pre 3.07 L    IC-%Pred-Pre 121 %    ERV-Pred 0.70 L    ERV-Pre 0.21 L    ERV-%Pred-Pre 29 %    FEV1FEV6-Pred 80 %    FEV1FEV6-Pre 80 %    FRCPleth-Pred 2.70 L    FRCPleth-Pre 2.05 L    FRCPleth-%Pred-Pre 75 %    RVPleth-Pred 1.93 L    RVPleth-Pre 1.85 L    RVPleth-%Pred-Pre 95 %    TLCPleth-Pred 4.94 L    TLCPleth-Pre 5.12 L    TLCPleth-%Pred-Pre 103 %    DLCOunc-Pred 20.07 ml/min/mmHg    DLCOunc-Pre 21.12 ml/min/mmHg    DLCOunc-%Pred-Pre 105 %    VA-Pre 4.58 L    VA-%Pred-Pre 95 %    FEV1SVC-Pred 76 %    FEV1SVC-Pre 71 %       I reviewed pulmonary function test that was performed today.  This shows normal spirometry, lung  volumes and diffusing capacity.  Lung function is stable.    I reviewed results with the patient.      Assessment and plan:  Ms. Mckeon is a 62-year-old with chronic eosinophilic pneumonia.  Her chronic eosinophilic pneumonia is felt to be associated with radiation therapy for her breast cancer.  There are case reports of this that have been published.   1.  Chronic eosinophilic pneumonia.  PFT is normal and stable.  She has no symptoms to suggest recurrence or flare of the chronic eosinophilic pneumonia.  In the past, she had multiple relapses when she was tapered off prednisone.  She has done well with low-dose prednisone 5 mg daily and montelukast 10 mg at nighttime.  We will continue this regimen.  We did have a discussion about risks and benefits of prednisone.  She does have osteoporosis, but it is being managed.  Overall, I think the benefits outweigh the risk at this time, and she is in agreement.  We can discuss this again in the future.  I did encourage her to continue being active as she is doing.  She will return in 1 year with full PFT.  We will likely repeat a CT in the next 1 to 2 years as a new baseline.  I spent 39 minutes reviewing chart, reviewing test results, talking with and examining patient, formulating plan, and documentation on the day of the encounter.

## 2023-02-12 LAB
DLCOUNC-%PRED-PRE: 105 %
DLCOUNC-PRE: 21.12 ML/MIN/MMHG
DLCOUNC-PRED: 20.07 ML/MIN/MMHG
ERV-%PRED-PRE: 29 %
ERV-PRE: 0.21 L
ERV-PRED: 0.7 L
EXPTIME-PRE: 7.4 SEC
FEF2575-%PRED-PRE: 98 %
FEF2575-PRE: 2.15 L/SEC
FEF2575-PRED: 2.19 L/SEC
FEFMAX-%PRED-PRE: 117 %
FEFMAX-PRE: 7.28 L/SEC
FEFMAX-PRED: 6.18 L/SEC
FEV1-%PRED-PRE: 95 %
FEV1-PRE: 2.33 L
FEV1FEV6-PRE: 80 %
FEV1FEV6-PRED: 80 %
FEV1FVC-PRE: 80 %
FEV1FVC-PRED: 79 %
FEV1SVC-PRE: 71 %
FEV1SVC-PRED: 76 %
FIFMAX-PRE: 4.63 L/SEC
FRCPLETH-%PRED-PRE: 75 %
FRCPLETH-PRE: 2.05 L
FRCPLETH-PRED: 2.7 L
FVC-%PRED-PRE: 93 %
FVC-PRE: 2.91 L
FVC-PRED: 3.11 L
IC-%PRED-PRE: 121 %
IC-PRE: 3.07 L
IC-PRED: 2.53 L
RVPLETH-%PRED-PRE: 95 %
RVPLETH-PRE: 1.85 L
RVPLETH-PRED: 1.93 L
TLCPLETH-%PRED-PRE: 103 %
TLCPLETH-PRE: 5.12 L
TLCPLETH-PRED: 4.94 L
VA-%PRED-PRE: 95 %
VA-PRE: 4.58 L
VC-%PRED-PRE: 101 %
VC-PRE: 3.28 L
VC-PRED: 3.23 L

## 2023-05-19 ENCOUNTER — TELEPHONE (OUTPATIENT)
Dept: PULMONOLOGY | Facility: CLINIC | Age: 63
End: 2023-05-19
Payer: COMMERCIAL

## 2023-05-19 DIAGNOSIS — J84.9 ILD (INTERSTITIAL LUNG DISEASE) (H): ICD-10-CM

## 2023-05-19 RX ORDER — MONTELUKAST SODIUM 10 MG/1
TABLET ORAL
Qty: 90 TABLET | Refills: 4 | Status: SHIPPED | OUTPATIENT
Start: 2023-05-19 | End: 2024-08-26

## 2023-05-19 RX ORDER — PREDNISONE 5 MG/1
5 TABLET ORAL DAILY
Qty: 90 TABLET | Refills: 3 | Status: SHIPPED | OUTPATIENT
Start: 2023-05-19 | End: 2024-05-17

## 2023-05-22 RX ORDER — PREDNISONE 5 MG/1
5 TABLET ORAL DAILY
Qty: 90 TABLET | Refills: 3 | OUTPATIENT
Start: 2023-05-22

## 2023-06-01 ENCOUNTER — HEALTH MAINTENANCE LETTER (OUTPATIENT)
Age: 63
End: 2023-06-01

## 2023-11-05 ENCOUNTER — HEALTH MAINTENANCE LETTER (OUTPATIENT)
Age: 63
End: 2023-11-05

## 2024-01-12 ENCOUNTER — TELEPHONE (OUTPATIENT)
Dept: PULMONOLOGY | Facility: CLINIC | Age: 64
End: 2024-01-12
Payer: COMMERCIAL

## 2024-01-12 NOTE — TELEPHONE ENCOUNTER
Gato unread, spoke to pt to let her know I sent Dr. Ge response in a MyC, pt said she will read it.     Wilma Marquez, RN, BSN  ILD Nurse   [phone]      ----- Message from Paradise Ge MD sent at 1/9/2024  4:43 PM CST -----  Regarding: RE: ILD pt with breast cancer  I think her lung function is ok to have surgery.  Her surgeon should know that she is on chronic prednisone and will need stress dose steroids at the time of surgery.          ----- Message -----  From: Wilma Marquez RN  Sent: 1/8/2024   2:14 PM CST  To: Paradise Ge MD; #  Subject: ILD pt with breast cancer                        Hi Dr. Ge,     Pt is having double mastectomy in the near future for recurring breast CA. She does not know at this point if she will need chemo/radiation. She is wondering if there is anything she needs to tell her oncologist in regards to her lung disease and surgery? Her next follow up appt with  is in 2/9/24.    Wilma

## 2024-02-09 ENCOUNTER — OFFICE VISIT (OUTPATIENT)
Dept: PULMONOLOGY | Facility: CLINIC | Age: 64
End: 2024-02-09
Attending: INTERNAL MEDICINE
Payer: COMMERCIAL

## 2024-02-09 VITALS
RESPIRATION RATE: 17 BRPM | OXYGEN SATURATION: 97 % | SYSTOLIC BLOOD PRESSURE: 133 MMHG | HEART RATE: 90 BPM | DIASTOLIC BLOOD PRESSURE: 81 MMHG | BODY MASS INDEX: 27.93 KG/M2 | WEIGHT: 163.6 LBS | HEIGHT: 64 IN

## 2024-02-09 DIAGNOSIS — J84.9 ILD (INTERSTITIAL LUNG DISEASE) (H): ICD-10-CM

## 2024-02-09 DIAGNOSIS — J84.9 ILD (INTERSTITIAL LUNG DISEASE) (H): Primary | ICD-10-CM

## 2024-02-09 PROCEDURE — 94729 DIFFUSING CAPACITY: CPT | Performed by: INTERNAL MEDICINE

## 2024-02-09 PROCEDURE — 99214 OFFICE O/P EST MOD 30 MIN: CPT | Mod: 25 | Performed by: INTERNAL MEDICINE

## 2024-02-09 PROCEDURE — 99213 OFFICE O/P EST LOW 20 MIN: CPT | Performed by: INTERNAL MEDICINE

## 2024-02-09 PROCEDURE — 94375 RESPIRATORY FLOW VOLUME LOOP: CPT | Performed by: INTERNAL MEDICINE

## 2024-02-09 PROCEDURE — 94726 PLETHYSMOGRAPHY LUNG VOLUMES: CPT | Performed by: INTERNAL MEDICINE

## 2024-02-09 ASSESSMENT — PAIN SCALES - GENERAL: PAINLEVEL: MILD PAIN (3)

## 2024-02-09 NOTE — LETTER
2/9/2024         RE: Meenakshi Mckeon  [address] [place] [address]        Dear Colleague,    Thank you for referring your patient, Meenakshi Mckeon, to the [facility] [place]. Please see a copy of my visit note below.    [facility]    Reason for Visit  Meenakshi Mckeon is a 63 year old year old female who is being seen for RECHECK (Return Interstitial Lung )    HPI  Ms. 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    ILD history:  Her chronic eosinophilic pneumonia is felt to be associated with radiation therapy for her breast cancer.  There are case reports of this that have been published.  She was having recurrent flares when she first came to see me in clinic.  She has been treated with low-dose prednisone and montelukast, and I added the montelukast as a steroid sparing agent which has been described for chronic eosinophilic pneumonia.  She has done well with this combination.     She was last seen in ILD clinic on 2/10/2023.    Since her last ILD clinic visit, she has been diagnosed with right breast cancer. Found on a routine mammogram a couple months ago. Plan for double mastectomy April 2024.  She had left breast cancer 10 years ago.    No changes in breathing. Breathing feels good. No coughing. No activity limitations. Walks 2-3 miles per day with the dog. She goes on the treadmill, does yoga, rides bike, gardens as well.     Up-to-date on COVID, flu, and RSV vaccines.     ROS: Has some left shoulder soreness. No fevers, chills, dysphagia, headaches, vision changes, dysphagia, acid reflux, chest pain, abdominal pain, nausea, vomiting, diarrhea, hematochezia, melena, dysuria. Weight has been stable. Appetite stable. No muscle weakness. No diabetes history.     Current medications include:   - prednisone 5mg daily  - montelukast 10mg daily        Current Outpatient Medications    Medication    estradiol (ESTRACE) 0.1 MG/GM vaginal cream    montelukast (SINGULAIR) 10 MG tablet    Multiple Vitamins-Minerals (MULTIVITAMIN ADULT PO)    omeprazole 20 MG tablet    predniSONE (DELTASONE) 5 MG tablet    Sulfacetamide Sodium-Sulfur 10-5 % CREA    VITAMIN D, CHOLECALCIFEROL, PO    Zolpidem Tartrate (AMBIEN PO)    losartan (COZAAR) 50 MG tablet    venlafaxine (EFFEXOR-XR) 37.5 MG 24 hr capsule     No current facility-administered medications for this visit.     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 Started prednisone 2014.  BAL 2014 showed no eos (?if she was on pred at the timeL; several flares as pred has been tapered    Lung nodule     7 mm RLL nodule first noted     Right heart enlargement 2017       Past Surgical History:   Procedure Laterality Date    cubocalneal fusion      KNEE SURGERY      LUMPECTOMY BREAST Left        Social History     Socioeconomic History    Marital status:      Spouse name: Not on file    Number of children: Not on file    Years of education: Not on file    Highest education level: Not on file   Occupational History    Not on file   Tobacco Use    Smoking status: Former     Packs/day: 0.50     Years: 20.00     Additional pack years: 0.00     Total pack years: 10.00     Types: Cigarettes     Quit date: 2000     Years since quittin.6    Smokeless tobacco: Never   Substance and Sexual Activity    Alcohol use: Yes     Alcohol/week: 0.0 standard drinks of  "alcohol    Drug use: No    Sexual activity: Not on file   Other Topics Concern    Parent/sibling w/ CABG, MI or angioplasty before 65F 55M? Not Asked   Social History Narrative    No exposure to asbestos, hot tubs, birds, mold, silica.  Hobbies: gardening, biking.  Nurse at [employer].  .     Social Determinants of Health     Financial Resource Strain: Not on file   Food Insecurity: Not on file   Transportation Needs: Not on file   Physical Activity: Not on file   Stress: Not on file   Social Connections: Not on file   Interpersonal Safety: Not on file   Housing Stability: Not on file       Family History   Problem Relation Age of Onset    Cerebrovascular Disease Mother     Hypertension Mother     Chronic Obstructive Pulmonary Disease Mother     C.A.D. Maternal Grandfather         both grfa with MI           Vitals: /81   Pulse 90   Resp 17   Ht 1.626 m (5' 4\")   Wt 74.2 kg (163 lb 9.6 oz)   SpO2 97%   BMI 28.08 kg/m      Exam:   GENERAL APPEARANCE: Well developed, well nourished, alert, and in no apparent distress.   RESP: good air flow throughout.  No crackles. No rhonchi. No wheezes.  CV: Normal S1, S2, regular rhythm, normal rate. No murmur.  No LE edema.   MS: extremities normal. No clubbing. No cyanosis.  SKIN: no rash on limited exam.  NEURO: Mentation intact, speech normal, normal gait and stance.  PSYCH: mentation appears normal. and affect normal/bright.      Results:  Recent Results (from the past 168 hour(s))   General PFT Lab (Please always keep checked)    Collection Time: 02/09/24  9:45 AM   Result Value Ref Range    FVC-Pred 2.80 L    FVC-Pre 2.86 L    FVC-%Pred-Pre 101 %    FEV1-Pre 2.35 L    FEV1-%Pred-Pre 105 %    FEV1FVC-Pred 80 %    FEV1FVC-Pre 82 %    FEFMax-Pred 6.07 L/sec    FEFMax-Pre 6.95 L/sec    FEFMax-%Pred-Pre 114 %    FEF2575-Pred 2.00 L/sec    FEF2575-Pre 2.54 L/sec    VPY7398-%Pred-Pre 126 %    ExpTime-Pre 5.17 sec    FIFMax-Pre 5.02 L/sec    VC-Pred 3.19 L "    VC-Pre 3.12 L    VC-%Pred-Pre 97 %    IC-Pred 2.11 L    IC-Pre 3.07 L    IC-%Pred-Pre 145 %    ERV-Pred 1.05 L    ERV-Pre 0.05 L    ERV-%Pred-Pre 4 %    FEV1FEV6-Pred 80 %    FEV1FEV6-Pre 82 %    FRCPleth-Pred 2.71 L    FRCPleth-Pre 2.12 L    FRCPleth-%Pred-Pre 78 %    RVPleth-Pred 1.96 L    RVPleth-Pre 2.06 L    RVPleth-%Pred-Pre 105 %    TLCPleth-Pred 4.94 L    TLCPleth-Pre 5.18 L    TLCPleth-%Pred-Pre 104 %    DLCOunc-Pred 19.45 ml/min/mmHg    DLCOunc-Pre 21.52 ml/min/mmHg    DLCOunc-%Pred-Pre 110 %    VA-Pre 4.50 L    VA-%Pred-Pre 97 %    FEV1SVC-Pred 70 %    FEV1SVC-Pre 75 %       I reviewed pulmonary function test that was performed today.  This shows normal spirometry, lung volumes and diffusing capacity.  Lung function is stable.    I reviewed results with the patient.      Assessment and plan:  Ms. 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  Her chronic eosinophilic pneumonia is felt to be associated with radiation therapy for her past breast cancer 10 years ago.  There are case reports of this that have been published.   1.  Chronic eosinophilic pneumonia.  PFT is normal and stable.  She has no symptoms to suggest recurrence or flare of the chronic eosinophilic pneumonia.  In the past, she had multiple relapses when she was tapered off prednisone.  She has done well with low-dose prednisone 5 mg daily and montelukast 10 mg at nighttime.  We will continue this regimen. At next visit, will consider reducing prednisone dose to 4mg daily depending on symptoms, status of osteoporosis, etc.   2. Osteoporosis. Follows with primary care provider.   3. Chronic prednisone use. Would recommend stress-dose steroids with surgical procedure - she is going to have bilat mastectomies in April (hydrocortisone IV on day of surgery 100 mg IV prior to surgery, the 50 mg IV q8 hours for 24 hours, followed by prednisone 20mg daily x3 days, then resume home prednisone dose of 5mg daily).   She will return  in 1 year with full PFT.  We will consider a chest CT next year as a new baseline or if she develops pulmonary symptoms.  Patient seen and staffed with Dr. Corbett.   Helder Adhikari MD  IM PGY    I saw and evaluated patient with Resident.  Case discussed - agree with note.  I reviewed PFT: normal and stable.  Will continue pred 5 mg daily and montelukast 10 mg daily.  Stress dose steroids for surgery as above.  Will consider decrease in prednisone dose to 4 mg daily (although my recollection is that she developed symptoms on less than 5 mg daily).    I spent 32 minutes reviewing chart, reviewing test results, talking with and examining patient, formulating plan, and documentation on the day of the encounter.      DOMO CORBETT M.D.

## 2024-02-09 NOTE — PROGRESS NOTES
[facility] Inter[facility] Lung Disease Clinic    Reason for Visit  Meenakshi Mckeon is a 63 year old year old female who is being seen for RECHECK (Return Interstitial Lung )    HPI  Ms. 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    ILD history:  Her chronic eosinophilic pneumonia is felt to be associated with radiation therapy for her breast cancer.  There are case reports of this that have been published.  She was having recurrent flares when she first came to see me in clinic.  She has been treated with low-dose prednisone and montelukast, and I added the montelukast as a steroid sparing agent which has been described for chronic eosinophilic pneumonia.  She has done well with this combination.     She was last seen in ILD clinic on 2/10/2023.    Since her last ILD clinic visit, she has been diagnosed with right breast cancer. Found on a routine mammogram a couple months ago. Plan for double mastectomy April 2024.  She had left breast cancer 10 years ago.    No changes in breathing. Breathing feels good. No coughing. No activity limitations. Walks 2-3 miles per day with the dog. She goes on the treadmill, does yoga, rides bike, gardens as well.     Up-to-date on COVID, flu, and RSV vaccines.     ROS: Has some left shoulder soreness. No fevers, chills, dysphagia, headaches, vision changes, dysphagia, acid reflux, chest pain, abdominal pain, nausea, vomiting, diarrhea, hematochezia, melena, dysuria. Weight has been stable. Appetite stable. No muscle weakness. No diabetes history.     Current medications include:   - prednisone 5mg daily  - montelukast 10mg daily        Current Outpatient Medications   Medication    estradiol (ESTRACE) 0.1 MG/GM vaginal cream    montelukast (SINGULAIR) 10 MG tablet    Multiple Vitamins-Minerals (MULTIVITAMIN ADULT PO)    omeprazole 20 MG tablet    predniSONE (DELTASONE) 5 MG tablet    Sulfacetamide Sodium-Sulfur 10-5 % CREA    VITAMIN D, CHOLECALCIFEROL,  PO    Zolpidem Tartrate (AMBIEN PO)    losartan (COZAAR) 50 MG tablet    venlafaxine (EFFEXOR-XR) 37.5 MG 24 hr capsule     No current facility-administered medications for this visit.     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 Started prednisone 2014.  BAL 2014 showed no eos (?if she was on pred at the timeL; several flares as pred has been tapered    Lung nodule     7 mm RLL nodule first noted     Right heart enlargement 2017       Past Surgical History:   Procedure Laterality Date    cubocalneal fusion      KNEE SURGERY      LUMPECTOMY BREAST Left        Social History     Socioeconomic History    Marital status:      Spouse name: Not on file    Number of children: Not on file    Years of education: Not on file    Highest education level: Not on file   Occupational History    Not on file   Tobacco Use    Smoking status: Former     Packs/day: 0.50     Years: 20.00     Additional pack years: 0.00     Total pack years: 10.00     Types: Cigarettes     Quit date: 2000     Years since quittin.6    Smokeless tobacco: Never   Substance and Sexual Activity    Alcohol use: Yes     Alcohol/week: 0.0 standard drinks of alcohol    Drug use: No    Sexual activity: Not on file   Other Topics Concern    Parent/sibling w/ CABG, MI or angioplasty before 65F 55M? Not Asked   Social History Narrative    No exposure to asbestos, hot tubs, birds, mold, silica.  Hobbies: gardening, biking.  Nurse at [employer].   ".     Social Determinants of Health     Financial Resource Strain: Not on file   Food Insecurity: Not on file   Transportation Needs: Not on file   Physical Activity: Not on file   Stress: Not on file   Social Connections: Not on file   Interpersonal Safety: Not on file   Housing Stability: Not on file       Family History   Problem Relation Age of Onset    Cerebrovascular Disease Mother     Hypertension Mother     Chronic Obstructive Pulmonary Disease Mother     C.A.D. Maternal Grandfather         both grfa with MI           Vitals: /81   Pulse 90   Resp 17   Ht 1.626 m (5' 4\")   Wt 74.2 kg (163 lb 9.6 oz)   SpO2 97%   BMI 28.08 kg/m      Exam:   GENERAL APPEARANCE: Well developed, well nourished, alert, and in no apparent distress.   RESP: good air flow throughout.  No crackles. No rhonchi. No wheezes.  CV: Normal S1, S2, regular rhythm, normal rate. No murmur.  No LE edema.   MS: extremities normal. No clubbing. No cyanosis.  SKIN: no rash on limited exam.  NEURO: Mentation intact, speech normal, normal gait and stance.  PSYCH: mentation appears normal. and affect normal/bright.      Results:  Recent Results (from the past 168 hour(s))   General PFT Lab (Please always keep checked)    Collection Time: 02/09/24  9:45 AM   Result Value Ref Range    FVC-Pred 2.80 L    FVC-Pre 2.86 L    FVC-%Pred-Pre 101 %    FEV1-Pre 2.35 L    FEV1-%Pred-Pre 105 %    FEV1FVC-Pred 80 %    FEV1FVC-Pre 82 %    FEFMax-Pred 6.07 L/sec    FEFMax-Pre 6.95 L/sec    FEFMax-%Pred-Pre 114 %    FEF2575-Pred 2.00 L/sec    FEF2575-Pre 2.54 L/sec    TMO3905-%Pred-Pre 126 %    ExpTime-Pre 5.17 sec    FIFMax-Pre 5.02 L/sec    VC-Pred 3.19 L    VC-Pre 3.12 L    VC-%Pred-Pre 97 %    IC-Pred 2.11 L    IC-Pre 3.07 L    IC-%Pred-Pre 145 %    ERV-Pred 1.05 L    ERV-Pre 0.05 L    ERV-%Pred-Pre 4 %    FEV1FEV6-Pred 80 %    FEV1FEV6-Pre 82 %    FRCPleth-Pred 2.71 L    FRCPleth-Pre 2.12 L    FRCPleth-%Pred-Pre 78 %    RVPleth-Pred 1.96 L    " RVPleth-Pre 2.06 L    RVPleth-%Pred-Pre 105 %    TLCPleth-Pred 4.94 L    TLCPleth-Pre 5.18 L    TLCPleth-%Pred-Pre 104 %    DLCOunc-Pred 19.45 ml/min/mmHg    DLCOunc-Pre 21.52 ml/min/mmHg    DLCOunc-%Pred-Pre 110 %    VA-Pre 4.50 L    VA-%Pred-Pre 97 %    FEV1SVC-Pred 70 %    FEV1SVC-Pre 75 %       I reviewed pulmonary function test that was performed today.  This shows normal spirometry, lung volumes and diffusing capacity.  Lung function is stable.    I reviewed results with the patient.      Assessment and plan:  Ms. 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  Her chronic eosinophilic pneumonia is felt to be associated with radiation therapy for her past breast cancer 10 years ago.  There are case reports of this that have been published.   1.  Chronic eosinophilic pneumonia.  PFT is normal and stable.  She has no symptoms to suggest recurrence or flare of the chronic eosinophilic pneumonia.  In the past, she had multiple relapses when she was tapered off prednisone.  She has done well with low-dose prednisone 5 mg daily and montelukast 10 mg at nighttime.  We will continue this regimen. At next visit, will consider reducing prednisone dose to 4mg daily depending on symptoms, status of osteoporosis, etc.   2. Osteoporosis. Follows with primary care provider.   3. Chronic prednisone use. Would recommend stress-dose steroids with surgical procedure - she is going to have bilat mastectomies in April (hydrocortisone IV on day of surgery 100 mg IV prior to surgery, the 50 mg IV q8 hours for 24 hours, followed by prednisone 20mg daily x3 days, then resume home prednisone dose of 5mg daily).   She will return in 1 year with full PFT.  We will consider a chest CT next year as a new baseline or if she develops pulmonary symptoms.  Patient seen and staffed with Dr. Ge.   Helder Adhikari MD  IM PGY    I saw and evaluated patient with Resident.  Case discussed - agree with note.  I reviewed PFT: normal  and stable.  Will continue pred 5 mg daily and montelukast 10 mg daily.  Stress dose steroids for surgery as above.  Will consider decrease in prednisone dose to 4 mg daily (although my recollection is that she developed symptoms on less than 5 mg daily).    I spent 32 minutes reviewing chart, reviewing test results, talking with and examining patient, formulating plan, and documentation on the day of the encounter.      DOMO CORBETT M.D.

## 2024-02-09 NOTE — NURSING NOTE
Chief Complaint   Patient presents with    RECHECK     Return Interstitial Lung      Medications reviewed and vital signs taken.   Eleno Garnett, CMA

## 2024-02-10 LAB
DLCOUNC-%PRED-PRE: 110 %
DLCOUNC-PRE: 21.52 ML/MIN/MMHG
DLCOUNC-PRED: 19.45 ML/MIN/MMHG
ERV-%PRED-PRE: 4 %
ERV-PRE: 0.05 L
ERV-PRED: 1.05 L
EXPTIME-PRE: 5.17 SEC
FEF2575-%PRED-PRE: 126 %
FEF2575-PRE: 2.54 L/SEC
FEF2575-PRED: 2 L/SEC
FEFMAX-%PRED-PRE: 114 %
FEFMAX-PRE: 6.95 L/SEC
FEFMAX-PRED: 6.07 L/SEC
FEV1-%PRED-PRE: 105 %
FEV1-PRE: 2.35 L
FEV1FEV6-PRE: 82 %
FEV1FEV6-PRED: 80 %
FEV1FVC-PRE: 82 %
FEV1FVC-PRED: 80 %
FEV1SVC-PRE: 75 %
FEV1SVC-PRED: 70 %
FIFMAX-PRE: 5.02 L/SEC
FRCPLETH-%PRED-PRE: 78 %
FRCPLETH-PRE: 2.12 L
FRCPLETH-PRED: 2.71 L
FVC-%PRED-PRE: 101 %
FVC-PRE: 2.86 L
FVC-PRED: 2.8 L
IC-%PRED-PRE: 145 %
IC-PRE: 3.07 L
IC-PRED: 2.11 L
RVPLETH-%PRED-PRE: 105 %
RVPLETH-PRE: 2.06 L
RVPLETH-PRED: 1.96 L
TLCPLETH-%PRED-PRE: 104 %
TLCPLETH-PRE: 5.18 L
TLCPLETH-PRED: 4.94 L
VA-%PRED-PRE: 97 %
VA-PRE: 4.5 L
VC-%PRED-PRE: 97 %
VC-PRE: 3.12 L
VC-PRED: 3.19 L

## 2024-05-17 DIAGNOSIS — J84.9 ILD (INTERSTITIAL LUNG DISEASE) (H): ICD-10-CM

## 2024-05-17 RX ORDER — PREDNISONE 5 MG/1
5 TABLET ORAL DAILY
Qty: 90 TABLET | Refills: 3 | Status: SHIPPED | OUTPATIENT
Start: 2024-05-17

## 2024-05-17 RX ORDER — PREDNISONE 5 MG/1
5 TABLET ORAL DAILY
Qty: 90 TABLET | Refills: 3 | OUTPATIENT
Start: 2024-05-17

## 2024-08-26 DIAGNOSIS — J84.9 ILD (INTERSTITIAL LUNG DISEASE) (H): ICD-10-CM

## 2024-08-26 RX ORDER — MONTELUKAST SODIUM 10 MG/1
TABLET ORAL
Qty: 90 TABLET | Refills: 4 | Status: SHIPPED | OUTPATIENT
Start: 2024-08-26

## 2025-01-26 ENCOUNTER — HEALTH MAINTENANCE LETTER (OUTPATIENT)
Age: 65
End: 2025-01-26

## 2025-01-27 ENCOUNTER — OFFICE VISIT (OUTPATIENT)
Dept: PULMONOLOGY | Facility: CLINIC | Age: 65
End: 2025-01-27
Attending: INTERNAL MEDICINE
Payer: COMMERCIAL

## 2025-01-27 DIAGNOSIS — J84.9 ILD (INTERSTITIAL LUNG DISEASE) (H): ICD-10-CM

## 2025-01-27 LAB
DLCOUNC-%PRED-PRE: 107 %
DLCOUNC-PRE: 20.87 ML/MIN/MMHG
DLCOUNC-PRED: 19.37 ML/MIN/MMHG
ERV-%PRED-PRE: 4 %
ERV-PRE: 0.05 L
ERV-PRED: 1.04 L
EXPTIME-PRE: 5.7 SEC
FEF2575-%PRED-PRE: 122 %
FEF2575-PRE: 2.41 L/SEC
FEF2575-PRED: 1.96 L/SEC
FEFMAX-%PRED-PRE: 117 %
FEFMAX-PRE: 7.06 L/SEC
FEFMAX-PRED: 6.01 L/SEC
FEV1-%PRED-PRE: 108 %
FEV1-PRE: 2.38 L
FEV1FEV6-PRE: 82 %
FEV1FEV6-PRED: 80 %
FEV1FVC-PRE: 82 %
FEV1FVC-PRED: 80 %
FEV1SVC-PRE: 83 %
FEV1SVC-PRED: 69 %
FIFMAX-PRE: 5.23 L/SEC
FRCPLETH-%PRED-PRE: 75 %
FRCPLETH-PRE: 2.17 L
FRCPLETH-PRED: 2.87 L
FVC-%PRED-PRE: 105 %
FVC-PRE: 2.92 L
FVC-PRED: 2.78 L
IC-%PRED-PRE: 134 %
IC-PRE: 2.82 L
IC-PRED: 2.09 L
RVPLETH-%PRED-PRE: 107 %
RVPLETH-PRE: 2.13 L
RVPLETH-PRED: 1.98 L
TLCPLETH-%PRED-PRE: 101 %
TLCPLETH-PRE: 4.99 L
TLCPLETH-PRED: 4.94 L
VA-%PRED-PRE: 96 %
VA-PRE: 4.47 L
VC-%PRED-PRE: 90 %
VC-PRE: 2.86 L
VC-PRED: 3.17 L

## 2025-01-27 PROCEDURE — 94150 VITAL CAPACITY TEST: CPT | Performed by: INTERNAL MEDICINE

## 2025-01-27 PROCEDURE — 94375 RESPIRATORY FLOW VOLUME LOOP: CPT | Performed by: INTERNAL MEDICINE

## 2025-01-27 PROCEDURE — 94726 PLETHYSMOGRAPHY LUNG VOLUMES: CPT | Performed by: INTERNAL MEDICINE

## 2025-01-27 PROCEDURE — 94729 DIFFUSING CAPACITY: CPT | Performed by: INTERNAL MEDICINE

## 2025-01-30 ENCOUNTER — OFFICE VISIT (OUTPATIENT)
Dept: PULMONOLOGY | Facility: CLINIC | Age: 65
End: 2025-01-30
Attending: INTERNAL MEDICINE
Payer: COMMERCIAL

## 2025-01-30 VITALS — OXYGEN SATURATION: 100 % | HEART RATE: 82 BPM | SYSTOLIC BLOOD PRESSURE: 116 MMHG | DIASTOLIC BLOOD PRESSURE: 81 MMHG

## 2025-01-30 DIAGNOSIS — J84.9 ILD (INTERSTITIAL LUNG DISEASE) (H): Primary | ICD-10-CM

## 2025-01-30 PROCEDURE — 99212 OFFICE O/P EST SF 10 MIN: CPT | Performed by: INTERNAL MEDICINE

## 2025-01-30 ASSESSMENT — PAIN SCALES - GENERAL: PAINLEVEL_OUTOF10: NO PAIN (0)

## 2025-01-30 NOTE — PROGRESS NOTES
[facility]    Reason for Visit  Meenakshi Mckeon is a 64 year old year old female who is being seen for No chief complaint on file.    HPI  Ms. Mckeon is a 64-year-old with chronic eosinophilic pneumonia who is here for follow-up.    Her chronic eosinophilic pneumonia is felt to be associated with radiation therapy (2014) for her breast cancer.  There are case reports of this that have been published.  She was having recurrent flares when she first came to see me in clinic.  She has been treated with low-dose prednisone and montelukast, and I added the montelukast as a steroid sparing agent which has been described for chronic eosinophilic pneumonia.  She has done well with this combination.    Today, Meenakshi reports that she had her bilateral mastectomies in April 2024.  Surgery went well, and she had no pulmonary complications following surgery.  Currently, she denies dyspnea on exertion and can walk up a flight of stairs without feeling short of breath.  She denies paroxysmal nocturnal dyspnea, cough, new skin rashes or eye symptoms.  In the past, she has developed eye symptoms first when her chronic eosinophilic pneumonia flares.    She exercises regularly by walking on the treadmill, walking her dog 2 to 3 miles a day and also doing yoga.  She is up-to-date with her flu and COVID-19 vaccines.  She had a repeat DEXA scan last year that showed improvement in her bone density.  She is not currently on a bisphosphonate.          Current Outpatient Medications   Medication Sig Dispense Refill    estradiol (ESTRACE) 0.1 MG/GM vaginal cream Place a dime size amount on your finger and place in vagina every other night.      montelukast (SINGULAIR) 10 MG tablet TAKE ONE (1) TABLET BY MOUTH EVERY DAY 90 tablet 4    Multiple Vitamins-Minerals (MULTIVITAMIN ADULT PO)       omeprazole 20 MG tablet Take 1 tablet by mouth      predniSONE (DELTASONE) 5 MG tablet Take 1 tablet (5 mg) by  mouth daily 90 tablet 3    Sulfacetamide Sodium-Sulfur 10-5 % CREA       VITAMIN D, CHOLECALCIFEROL, PO Take by mouth daily      Zolpidem Tartrate (AMBIEN PO) Take 10 mg by mouth nightly as needed for sleep      losartan (COZAAR) 50 MG tablet Take 50 mg by mouth      venlafaxine (EFFEXOR-XR) 37.5 MG 24 hr capsule Take 37.5 mg by mouth       No current facility-administered medications for this visit.     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 Started prednisone 2014.  BAL 2014 showed no eos (?if she was on pred at the timeL; several flares as pred has been tapered    Lung nodule     7 mm RLL nodule first noted     Right heart enlargement 2017       Past Surgical History:   Procedure Laterality Date    cubocalneal fusion      KNEE SURGERY      LUMPECTOMY BREAST Left        Social History     Socioeconomic History    Marital status:      Spouse name: Not on file    Number of children: Not on file    Years of education: Not on file    Highest education level: Not on file   Occupational History    Not on file   Tobacco Use    Smoking status: Former     Current packs/day: 0.00     Average packs/day: 0.5 packs/day for 20.0 years (10.0 ttl pk-yrs)     Types: Cigarettes     Start date: 1980     Quit date: 2000     Years since quittin.5    Smokeless tobacco: Never   Substance and Sexual Activity    Alcohol use: Yes     Alcohol/week: 0.0 standard drinks of alcohol    Drug use: No    Sexual activity: Not  on file   Other Topics Concern    Parent/sibling w/ CABG, MI or angioplasty before 65F 55M? Not Asked   Social History Narrative    No exposure to asbestos, hot tubs, birds, mold, silica.  Hobbies: gardening, biking.  Nurse at [employer].  .     Social Drivers of Health     Financial Resource Strain: Not At Risk (12/11/2023)    [facility]    Financial Resource Strain     Is it hard for you to pay for the very basics like food, housing, medical care or heating?: No   Food Insecurity: Not At Risk (12/11/2023)    Received from [facility]    Food Insecurity     Does your food run out before you have the money to buy more?: No   Transportation Needs: Not At Risk (12/11/2023)    [facility]    Transportation Needs     Does a lack of transportation keep you from your medical appointments or from getting your medications?: No   Physical Activity: Not on file   Stress: Not on file   Social Connections: Not on file   Interpersonal Safety: Unknown (4/1/2024)    Received from [facility]    Humiliation, Afraid, Rape, and Kick questionnaire     Fear of Current or Ex-Partner: Not on file     Emotionally Abused: Not on file     Physically Abused: No     Sexually Abused: No   Housing Stability: Not on file       Family History   Problem Relation Age of Onset    Cerebrovascular Disease Mother     Hypertension Mother     Chronic Obstructive Pulmonary Disease Mother     C.A.D. Maternal Grandfather         both grfa with MI           Vitals: /81 (BP Location: Right arm, Patient Position: Sitting, Cuff Size: Adult Regular)   Pulse 82   SpO2 100%     Exam:   GENERAL APPEARANCE: Well developed, well nourished, alert, and in no apparent distress.  RESP: good air flow throughout.  No crackles. No rhonchi. No wheezes.  CV: Normal S1, S2, regular rhythm, normal rate. No murmur.  No LE edema.   MS: extremities normal. No clubbing. No  cyanosis.  SKIN: no rash on limited exam.  NEURO: Mentation intact, speech normal.  PSYCH: mentation appears normal and affect normal/bright.      Results:  Recent Results (from the past week)   General PFT Lab (Please always keep checked)    Collection Time: 01/27/25  9:43 AM   Result Value Ref Range    FVC-Pred 2.78 L    FVC-Pre 2.92 L    FVC-%Pred-Pre 105 %    FEV1-Pre 2.38 L    FEV1-%Pred-Pre 108 %    FEV1FVC-Pred 80 %    FEV1FVC-Pre 82 %    FEFMax-Pred 6.01 L/sec    FEFMax-Pre 7.06 L/sec    FEFMax-%Pred-Pre 117 %    FEF2575-Pred 1.96 L/sec    FEF2575-Pre 2.41 L/sec    MWY3513-%Pred-Pre 122 %    ExpTime-Pre 5.70 sec    FIFMax-Pre 5.23 L/sec    VC-Pred 3.17 L    VC-Pre 2.86 L    VC-%Pred-Pre 90 %    IC-Pred 2.09 L    IC-Pre 2.82 L    IC-%Pred-Pre 134 %    ERV-Pred 1.04 L    ERV-Pre 0.05 L    ERV-%Pred-Pre 4 %    FEV1FEV6-Pred 80 %    FEV1FEV6-Pre 82 %    FRCPleth-Pred 2.87 L    FRCPleth-Pre 2.17 L    FRCPleth-%Pred-Pre 75 %    RVPleth-Pred 1.98 L    RVPleth-Pre 2.13 L    RVPleth-%Pred-Pre 107 %    TLCPleth-Pred 4.94 L    TLCPleth-Pre 4.99 L    TLCPleth-%Pred-Pre 101 %    DLCOunc-Pred 19.37 ml/min/mmHg    DLCOunc-Pre 20.87 ml/min/mmHg    DLCOunc-%Pred-Pre 107 %    VA-Pre 4.47 L    VA-%Pred-Pre 96 %    FEV1SVC-Pred 69 %    FEV1SVC-Pre 83 %       I reviewed pulmonary function test that was performed on January 27.  This shows normal spirometry, lung volumes and diffusion.  Lung function is stable.    I reviewed results with the patient.          Assessment and plan:  Ms. Mckeon is a 64-year-old with chronic eosinophilic pneumonia who is here for follow-up.    1.  Chronic eosinophilic pneumonia.  PFT is stable, and Meenakshi has no pulmonary symptoms.  She has had no flares since being on prednisone 5 mg daily with montelukast.  We talked about the risk of osteoporosis with the prednisone, but her DEXA scan last year showed improvement in bone density with a bisphosphonate.  My recollection is that her chronic  eosinophilic pneumonia has flared when her prednisone dose got to below 5 mg daily, and that is her recollection as well.  However, she is 10 years out from radiation therapy, which might lessen the risk of a flare.  After discussion, we are both in agreement to continue prednisone 5 mg daily and montelukast 10 mg daily.  We will reevaluate if her DEXA scan changes.  I encouraged her to continue exercising as she is doing.  She will return in 1 year with full PFT and a chest CT scan for new baseline of her chronic eosinophilic pneumonia.  2.  Chronic prednisone use.  She was treated with a bisphosphonate in the past for her osteoporosis.  She is not on enough prednisone to require pneumocystis prophylaxis.    The longitudinal plan of care for the diagnosis(es)/condition(s) as documented were addressed during this visit. Due to the added complexity in care, I will continue to support Meenakshi in the subsequent management and with ongoing continuity of care.    I spent 30 minutes reviewing chart, reviewing test results, talking with and examining patient, formulating plan, and documentation on the day of the encounter.

## 2025-01-30 NOTE — LETTER
1/30/2025      Meenakshi Mckeon  [address] [id number]      Dear Colleague,    Thank you for referring your patient, Meenakshi Mckeon, to the [facility] L[facility] AND [facility] [place]. Please see a copy of my visit note below.    [facility] Disease [facility]    Reason for Visit  Meenakshi Mckeon is a 64 year old year old female who is being seen for No chief complaint on file.    HPI  Ms. Mckeon is a 64-year-old with chronic eosinophilic pneumonia who is here for follow-up.    Her chronic eosinophilic pneumonia is felt to be associated with radiation therapy (2014) for her breast cancer.  There are case reports of this that have been published.  She was having recurrent flares when she first came to see me in clinic.  She has been treated with low-dose prednisone and montelukast, and I added the montelukast as a steroid sparing agent which has been described for chronic eosinophilic pneumonia.  She has done well with this combination.    Today, Meenakshi reports that she had her bilateral mastectomies in April 2024.  Surgery went well, and she had no pulmonary complications following surgery.  Currently, she denies dyspnea on exertion and can walk up a flight of stairs without feeling short of breath.  She denies paroxysmal nocturnal dyspnea, cough, new skin rashes or eye symptoms.  In the past, she has developed eye symptoms first when her chronic eosinophilic pneumonia flares.    She exercises regularly by walking on the treadmill, walking her dog 2 to 3 miles a day and also doing yoga.  She is up-to-date with her flu and COVID-19 vaccines.  She had a repeat DEXA scan last year that showed improvement in her bone density.  She is not currently on a bisphosphonate.          Current Outpatient Medications   Medication Sig Dispense Refill     estradiol (ESTRACE) 0.1 MG/GM vaginal cream Place a dime size amount on your finger and place in vagina every  other night.       montelukast (SINGULAIR) 10 MG tablet TAKE ONE (1) TABLET BY MOUTH EVERY DAY 90 tablet 4     Multiple Vitamins-Minerals (MULTIVITAMIN ADULT PO)        omeprazole 20 MG tablet Take 1 tablet by mouth       predniSONE (DELTASONE) 5 MG tablet Take 1 tablet (5 mg) by mouth daily 90 tablet 3     Sulfacetamide Sodium-Sulfur 10-5 % CREA        VITAMIN D, CHOLECALCIFEROL, PO Take by mouth daily       Zolpidem Tartrate (AMBIEN PO) Take 10 mg by mouth nightly as needed for sleep       losartan (COZAAR) 50 MG tablet Take 50 mg by mouth       venlafaxine (EFFEXOR-XR) 37.5 MG 24 hr capsule Take 37.5 mg by mouth       No current facility-administered medications for this visit.     Allergies   Allergen Reactions     Ibuprofen GI Disturbance     Kenalog [Triamcinolone] Swelling     Past Medical History:   Diagnosis Date     Atrial fibrillation (H) 10/2014     Cancer (H)     Breast DCIS dx 2013. Lumpectomy and radiation completed 1/2014     Gastro-oesophageal reflux disease      ILD (interstitial lung disease) (H)     chronic eosinophilic pneumonia associated with radiation therapy, CT-guided needle bx 2014 of LLL consolidation showed organizing eosinophlic pneumonia (reviewed by Soha at [facility]). Started prednisone 4/2014.  BAL 6/2014 showed no eos (?if she was on pred at the timeL; several flares as pred has been tapered     Lung nodule     7 mm RLL nodule first noted 2016     Right heart enlargement 2/8/2017       Past Surgical History:   Procedure Laterality Date     cubocalneal fusion       KNEE SURGERY       LUMPECTOMY BREAST Left 2013       Social History     Socioeconomic History     Marital status:      Spouse name: Not on file     Number of children: Not on file     Years of education: Not on file     Highest education level: Not on file   Occupational History     Not on file   Tobacco Use     Smoking status: Former     Current packs/day: 0.00     Average packs/day: 0.5 packs/day  for 20.0 years (10.0 ttl pk-yrs)     Types: Cigarettes     Start date: 1980     Quit date: 2000     Years since quittin.5     Smokeless tobacco: Never   Substance and Sexual Activity     Alcohol use: Yes     Alcohol/week: 0.0 standard drinks of alcohol     Drug use: No     Sexual activity: Not on file   Other Topics Concern     Parent/sibling w/ CABG, MI or angioplasty before 65F 55M? Not Asked   Social History Narrative    No exposure to asbestos, hot tubs, birds, mold, silica.  Hobbies: gardening, biking.  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  .     Social Drivers of Health     Financial Resource Strain: Not At Risk (2023)    Received from [facility]    Financial Resource Strain      Is it hard for you to pay for the very basics like food, housing, medical care or heating?: No   Food Insecurity: Not At Risk (2023)    [facility]    Food Insecurity      Does your food run out before you have the money to buy more?: No   Transportation Needs: Not At Risk (2023)    Received from [facility]    Transportation Needs      Does a lack of transportation keep you from your medical appointments or from getting your medications?: No   Physical Activity: Not on file   Stress: Not on file   Social Connections: Not on file   Interpersonal Safety: Unknown (2024)    Received from [facility]    Humiliation, Afraid, Rape, and Kick questionnaire      Fear of Current or Ex-Partner: Not on file      Emotionally Abused: Not on file      Physically Abused: No      Sexually Abused: No   Housing Stability: Not on file       Family History   Problem Relation Age of Onset     Cerebrovascular Disease Mother      Hypertension Mother      Chronic Obstructive Pulmonary Disease Mother      C.A.D. Maternal Grandfather         both grfa with MI           Vitals: /81 (BP Location: Right arm, Patient Position: Sitting, Cuff Size: Adult  Regular)   Pulse 82   SpO2 100%     Exam:   GENERAL APPEARANCE: Well developed, well nourished, alert, and in no apparent distress.  RESP: good air flow throughout.  No crackles. No rhonchi. No wheezes.  CV: Normal S1, S2, regular rhythm, normal rate. No murmur.  No LE edema.   MS: extremities normal. No clubbing. No cyanosis.  SKIN: no rash on limited exam.  NEURO: Mentation intact, speech normal.  PSYCH: mentation appears normal and affect normal/bright.      Results:  Recent Results (from the past week)   General PFT Lab (Please always keep checked)    Collection Time: 01/27/25  9:43 AM   Result Value Ref Range    FVC-Pred 2.78 L    FVC-Pre 2.92 L    FVC-%Pred-Pre 105 %    FEV1-Pre 2.38 L    FEV1-%Pred-Pre 108 %    FEV1FVC-Pred 80 %    FEV1FVC-Pre 82 %    FEFMax-Pred 6.01 L/sec    FEFMax-Pre 7.06 L/sec    FEFMax-%Pred-Pre 117 %    FEF2575-Pred 1.96 L/sec    FEF2575-Pre 2.41 L/sec    [id number]-%Pred-Pre 122 %    ExpTime-Pre 5.70 sec    FIFMax-Pre 5.23 L/sec    VC-Pred 3.17 L    VC-Pre 2.86 L    VC-%Pred-Pre 90 %    IC-Pred 2.09 L    IC-Pre 2.82 L    IC-%Pred-Pre 134 %    ERV-Pred 1.04 L    ERV-Pre 0.05 L    ERV-%Pred-Pre 4 %    FEV1FEV6-Pred 80 %    FEV1FEV6-Pre 82 %    FRCPleth-Pred 2.87 L    FRCPleth-Pre 2.17 L    FRCPleth-%Pred-Pre 75 %    RVPleth-Pred 1.98 L    RVPleth-Pre 2.13 L    RVPleth-%Pred-Pre 107 %    TLCPleth-Pred 4.94 L    TLCPleth-Pre 4.99 L    TLCPleth-%Pred-Pre 101 %    DLCOunc-Pred 19.37 ml/min/mmHg    DLCOunc-Pre 20.87 ml/min/mmHg    DLCOunc-%Pred-Pre 107 %    VA-Pre 4.47 L    VA-%Pred-Pre 96 %    FEV1SVC-Pred 69 %    FEV1SVC-Pre 83 %       I reviewed pulmonary function test that was performed on January 27.  This shows normal spirometry, lung volumes and diffusion.  Lung function is stable.    I reviewed results with the patient.          Assessment and plan:  Ms. Mckeon is a 64-year-old with chronic eosinophilic pneumonia who is here for follow-up.    1.  Chronic eosinophilic pneumonia.  PFT  is stable, and Meenakshi has no pulmonary symptoms.  She has had no flares since being on prednisone 5 mg daily with montelukast.  We talked about the risk of osteoporosis with the prednisone, but her DEXA scan last year showed improvement in bone density with a bisphosphonate.  My recollection is that her chronic eosinophilic pneumonia has flared when her prednisone dose got to below 5 mg daily, and that is her recollection as well.  However, she is 10 years out from radiation therapy, which might lessen the risk of a flare.  After discussion, we are both in agreement to continue prednisone 5 mg daily and montelukast 10 mg daily.  We will reevaluate if her DEXA scan changes.  I encouraged her to continue exercising as she is doing.  She will return in 1 year with full PFT and a chest CT scan for new baseline of her chronic eosinophilic pneumonia.  2.  Chronic prednisone use.  She was treated with a bisphosphonate in the past for her osteoporosis.  She is not on enough prednisone to require pneumocystis prophylaxis.    The longitudinal plan of care for the diagnosis(es)/condition(s) as documented were addressed during this visit. Due to the added complexity in care, I will continue to support Meenakshi in the subsequent management and with ongoing continuity of care.    I spent 30 minutes reviewing chart, reviewing test results, talking with and examining patient, formulating plan, and documentation on the day of the encounter.        Again, thank you for allowing me to participate in the care of your patient.        Sincerely,        Paradise Ge MD    Electronically signed

## 2025-05-22 DIAGNOSIS — J84.9 ILD (INTERSTITIAL LUNG DISEASE) (H): ICD-10-CM

## 2025-05-22 RX ORDER — PREDNISONE 5 MG/1
5 TABLET ORAL DAILY
Qty: 90 TABLET | Refills: 3 | Status: SHIPPED | OUTPATIENT
Start: 2025-05-22